# Patient Record
Sex: MALE | Employment: FULL TIME | URBAN - METROPOLITAN AREA
[De-identification: names, ages, dates, MRNs, and addresses within clinical notes are randomized per-mention and may not be internally consistent; named-entity substitution may affect disease eponyms.]

---

## 2017-01-10 RX ORDER — ZOLPIDEM TARTRATE 10 MG/1
TABLET ORAL
Qty: 30 TABLET | Refills: 2 | Status: SHIPPED | OUTPATIENT
Start: 2017-01-10 | End: 2017-05-30

## 2017-03-16 ENCOUNTER — TELEPHONE (OUTPATIENT)
Dept: FAMILY MEDICINE CLINIC | Facility: CLINIC | Age: 47
End: 2017-03-16

## 2017-03-16 NOTE — TELEPHONE ENCOUNTER
Wife is calling regarding pt having diarrhea. Pt is not vomiting as of yet.   Spouse would like to know what to do because she state that he has the same symptoms that she has

## 2017-03-16 NOTE — TELEPHONE ENCOUNTER
Actions Requested: spouse is being treated for gastroenteritis and now pt is having diarrhea, no vomiting. Asking if pt can have Zofran prescribed.   Situation/Background   Problem: diarrhea   Onset: today   Associated Symptoms: some nausea, no vomiting yet

## 2017-03-17 NOTE — TELEPHONE ENCOUNTER
Spoke with spouse and patient has thrown up once and now pretty quiet. No Zofran prescribed at this point.

## 2017-04-27 ENCOUNTER — OFFICE VISIT (OUTPATIENT)
Dept: FAMILY MEDICINE CLINIC | Facility: CLINIC | Age: 47
End: 2017-04-27

## 2017-04-27 VITALS
TEMPERATURE: 98 F | RESPIRATION RATE: 16 BRPM | WEIGHT: 204 LBS | HEIGHT: 65.5 IN | BODY MASS INDEX: 33.58 KG/M2 | HEART RATE: 60 BPM | SYSTOLIC BLOOD PRESSURE: 112 MMHG | DIASTOLIC BLOOD PRESSURE: 76 MMHG

## 2017-04-27 DIAGNOSIS — Z13.220 LIPID SCREENING: ICD-10-CM

## 2017-04-27 DIAGNOSIS — R53.83 FATIGUE, UNSPECIFIED TYPE: Primary | ICD-10-CM

## 2017-04-27 DIAGNOSIS — G47.26 SHIFT WORK SLEEP DISORDER: ICD-10-CM

## 2017-04-27 PROCEDURE — 99212 OFFICE O/P EST SF 10 MIN: CPT | Performed by: FAMILY MEDICINE

## 2017-04-27 PROCEDURE — 99214 OFFICE O/P EST MOD 30 MIN: CPT | Performed by: FAMILY MEDICINE

## 2017-04-27 NOTE — PATIENT INSTRUCTIONS
Continue with sleep medication. Consider sleep study. Chemistries ordered and done. Testosterone level drawn as well. Increase water intake and consider green smoothie daily.

## 2017-04-27 NOTE — PROGRESS NOTES
HPI:    Patient ID: Heath Dickens is a 48year old male. Malaise  This is a chronic problem. The current episode started more than 1 month ago. The problem occurs constantly. The problem has been gradually worsening.  Associated symptoms include fatigu present. Pulmonary/Chest: Effort normal and breath sounds normal. No respiratory distress. Abdominal: Soft. Bowel sounds are normal. He exhibits no distension. Neurological: He is alert and oriented to person, place, and time.  No cranial nerve deficit

## 2017-05-06 ENCOUNTER — TELEPHONE (OUTPATIENT)
Dept: FAMILY MEDICINE CLINIC | Facility: CLINIC | Age: 47
End: 2017-05-06

## 2017-05-06 RX ORDER — CHOLECALCIFEROL (VITAMIN D3) 1250 MCG
50000 CAPSULE ORAL
Qty: 3 CAPSULE | Refills: 2 | Status: SHIPPED | OUTPATIENT
Start: 2017-05-06 | End: 2018-04-14

## 2017-05-06 NOTE — TELEPHONE ENCOUNTER
----- Message from Cait Velasquez DO sent at 5/3/2017  7:05 AM CDT -----  All labs reviewed and are normal including testosterone. Notes Recorded by Ammy Shetty DO on 4/30/2017 at 10:11 PM  Vitamin D insufficient.  Prescription supplement s

## 2017-05-06 NOTE — TELEPHONE ENCOUNTER
Dr. Lyndsey Nicholas: please confirm dosage on Vitamin D. I didn't see RX in med list.    Then we will let patient know of results.

## 2017-05-30 RX ORDER — ZOLPIDEM TARTRATE 10 MG/1
TABLET ORAL
Qty: 30 TABLET | Refills: 2 | Status: SHIPPED | OUTPATIENT
Start: 2017-05-30 | End: 2017-08-28

## 2017-05-30 NOTE — TELEPHONE ENCOUNTER
Shruthi Barksdale needs a refill of:    •  Zolpidem Tartrate 10 MG Oral Tab, TAKE 1 TABLET BY MOUTH NIGHTLY AS NEEDED FOR SLEEP, Disp: 30 tablet, Rfl: 2

## 2017-05-31 RX ORDER — AZITHROMYCIN 250 MG/1
TABLET, FILM COATED ORAL
Qty: 6 TABLET | Refills: 0 | Status: SHIPPED | OUTPATIENT
Start: 2017-05-31 | End: 2017-07-10 | Stop reason: ALTCHOICE

## 2017-07-10 ENCOUNTER — OFFICE VISIT (OUTPATIENT)
Dept: GASTROENTEROLOGY | Facility: CLINIC | Age: 47
End: 2017-07-10

## 2017-07-10 VITALS
SYSTOLIC BLOOD PRESSURE: 106 MMHG | DIASTOLIC BLOOD PRESSURE: 71 MMHG | WEIGHT: 207.19 LBS | HEART RATE: 73 BPM | HEIGHT: 65 IN | BODY MASS INDEX: 34.52 KG/M2

## 2017-07-10 DIAGNOSIS — K62.5 RECTAL BLEEDING: ICD-10-CM

## 2017-07-10 DIAGNOSIS — Z12.11 SCREEN FOR COLON CANCER: Primary | ICD-10-CM

## 2017-07-10 PROCEDURE — 99213 OFFICE O/P EST LOW 20 MIN: CPT | Performed by: INTERNAL MEDICINE

## 2017-07-10 PROCEDURE — 99212 OFFICE O/P EST SF 10 MIN: CPT | Performed by: INTERNAL MEDICINE

## 2017-07-10 RX ORDER — CHOLECALCIFEROL (VITAMIN D3) 1250 MCG
CAPSULE ORAL
Refills: 2 | COMMUNITY
Start: 2017-06-27 | End: 2020-01-07 | Stop reason: ALTCHOICE

## 2017-07-10 RX ORDER — POLYETHYLENE GLYCOL 3350, SODIUM CHLORIDE, SODIUM BICARBONATE, POTASSIUM CHLORIDE 420; 11.2; 5.72; 1.48 G/4L; G/4L; G/4L; G/4L
POWDER, FOR SOLUTION ORAL
Qty: 1 BOTTLE | Refills: 0 | Status: SHIPPED | OUTPATIENT
Start: 2017-07-10 | End: 2018-07-21 | Stop reason: ALTCHOICE

## 2017-07-10 NOTE — PATIENT INSTRUCTIONS
1. Schedule colonoscopy with IV sedation    2.  bowel prep from pharmacy (split trilyte)    3. Continue all medications for procedure    4. Read all bowel prep instructions carefully    5.  AVOID seeds, nuts, popcorn, raw fruits and vegetables (anne

## 2017-07-10 NOTE — H&P
[address] [facility] [address] Gastroenterology                                                                                                  Clinic History and Physical     Pa by mouth  WHEN YOU ARRIVE AT THE OFFICE   for your procedure; you must have a . After the procedure, take 1 tablet every 4-6 hours for pain.  Disp: 20 tablet Rfl: 0       Allergies:  No Known Allergies    ROS:   CONSTITUTIONAL:  negative for fevers, proceed with screening colonoscopy. Patient is currently asymptomatic and denies diarrhea, hematochezia, thin-stools or weight loss.  We discussed risks/benefits/alternatives to procedure, including CT colonography and stool testing, they want to proceed wi

## 2017-07-27 ENCOUNTER — TELEPHONE (OUTPATIENT)
Dept: GASTROENTEROLOGY | Facility: CLINIC | Age: 47
End: 2017-07-27

## 2017-07-27 NOTE — TELEPHONE ENCOUNTER
Scheduled for:  Colonoscopy 13453  Provider Name: Salma De La Cruz  Date:  Wed 8/09/17  Location:  [facility]  Sedation:  IV  Time:  3:15 pm  Prep: split dose trilyte  Meds/Allergies Reconciled?:  NKDA  Diagnosis with codes:  Colon cancer screening  Z12.11, rectal bleeding

## 2017-08-09 ENCOUNTER — SURGERY (OUTPATIENT)
Age: 47
End: 2017-08-09

## 2017-08-09 ENCOUNTER — HOSPITAL ENCOUNTER (OUTPATIENT)
Facility: HOSPITAL | Age: 47
Setting detail: HOSPITAL OUTPATIENT SURGERY
Discharge: HOME | End: 2017-08-09
Attending: INTERNAL MEDICINE | Admitting: INTERNAL MEDICINE
Payer: COMMERCIAL

## 2017-08-09 VITALS
BODY MASS INDEX: 34.99 KG/M2 | RESPIRATION RATE: 14 BRPM | HEART RATE: 60 BPM | WEIGHT: 210 LBS | SYSTOLIC BLOOD PRESSURE: 111 MMHG | OXYGEN SATURATION: 97 % | HEIGHT: 65 IN | DIASTOLIC BLOOD PRESSURE: 73 MMHG

## 2017-08-09 DIAGNOSIS — K62.5 ANAL BLEEDING: ICD-10-CM

## 2017-08-09 DIAGNOSIS — Z12.11 COLON CANCER SCREENING: ICD-10-CM

## 2017-08-09 PROCEDURE — 45385 COLONOSCOPY W/LESION REMOVAL: CPT | Performed by: INTERNAL MEDICINE

## 2017-08-09 PROCEDURE — 0DBM8ZX EXCISION OF DESCENDING COLON, VIA NATURAL OR ARTIFICIAL OPENING ENDOSCOPIC, DIAGNOSTIC: ICD-10-PCS | Performed by: INTERNAL MEDICINE

## 2017-08-09 PROCEDURE — 99152 MOD SED SAME PHYS/QHP 5/>YRS: CPT | Performed by: INTERNAL MEDICINE

## 2017-08-09 PROCEDURE — 0DBH8ZX EXCISION OF CECUM, VIA NATURAL OR ARTIFICIAL OPENING ENDOSCOPIC, DIAGNOSTIC: ICD-10-PCS | Performed by: INTERNAL MEDICINE

## 2017-08-09 PROCEDURE — 0DBK8ZX EXCISION OF ASCENDING COLON, VIA NATURAL OR ARTIFICIAL OPENING ENDOSCOPIC, DIAGNOSTIC: ICD-10-PCS | Performed by: INTERNAL MEDICINE

## 2017-08-09 PROCEDURE — 99153 MOD SED SAME PHYS/QHP EA: CPT | Performed by: INTERNAL MEDICINE

## 2017-08-09 RX ORDER — SODIUM CHLORIDE 0.9 % (FLUSH) 0.9 %
10 SYRINGE (ML) INJECTION AS NEEDED
Status: DISCONTINUED | OUTPATIENT
Start: 2017-08-09 | End: 2017-08-09

## 2017-08-09 RX ORDER — MIDAZOLAM HYDROCHLORIDE 1 MG/ML
1 INJECTION INTRAMUSCULAR; INTRAVENOUS EVERY 5 MIN PRN
Status: DISCONTINUED | OUTPATIENT
Start: 2017-08-09 | End: 2017-08-09

## 2017-08-09 RX ORDER — SODIUM CHLORIDE, SODIUM LACTATE, POTASSIUM CHLORIDE, CALCIUM CHLORIDE 600; 310; 30; 20 MG/100ML; MG/100ML; MG/100ML; MG/100ML
INJECTION, SOLUTION INTRAVENOUS CONTINUOUS
Status: DISCONTINUED | OUTPATIENT
Start: 2017-08-09 | End: 2017-08-09

## 2017-08-09 RX ORDER — MIDAZOLAM HYDROCHLORIDE 1 MG/ML
INJECTION INTRAMUSCULAR; INTRAVENOUS
Status: DISCONTINUED | OUTPATIENT
Start: 2017-08-09 | End: 2017-08-09

## 2017-08-11 ENCOUNTER — TELEPHONE (OUTPATIENT)
Dept: GASTROENTEROLOGY | Facility: CLINIC | Age: 47
End: 2017-08-11

## 2017-08-11 NOTE — TELEPHONE ENCOUNTER
I mailed out CLN results letter to pt  Updated health maintenance  Entered into 5 yr CLN recall  Recall colon in 5 years per.  Colon done 8/09/17

## 2017-08-28 RX ORDER — ZOLPIDEM TARTRATE 10 MG/1
TABLET ORAL
Qty: 30 TABLET | Refills: 2 | Status: SHIPPED | OUTPATIENT
Start: 2017-08-28 | End: 2017-11-17

## 2017-09-18 ENCOUNTER — HOSPITAL ENCOUNTER (OUTPATIENT)
Dept: GENERAL RADIOLOGY | Age: 47
Discharge: HOME | End: 2017-09-18
Attending: FAMILY MEDICINE
Payer: COMMERCIAL

## 2017-09-18 ENCOUNTER — OFFICE VISIT (OUTPATIENT)
Dept: FAMILY MEDICINE CLINIC | Facility: CLINIC | Age: 47
End: 2017-09-18

## 2017-09-18 VITALS — HEIGHT: 65 IN

## 2017-09-18 DIAGNOSIS — R25.3 MUSCLE TWITCHING: ICD-10-CM

## 2017-09-18 DIAGNOSIS — M79.642 CHRONIC HAND PAIN, LEFT: Primary | ICD-10-CM

## 2017-09-18 DIAGNOSIS — M79.642 CHRONIC HAND PAIN, LEFT: ICD-10-CM

## 2017-09-18 DIAGNOSIS — G89.29 CHRONIC HAND PAIN, LEFT: ICD-10-CM

## 2017-09-18 DIAGNOSIS — G89.29 CHRONIC HAND PAIN, LEFT: Primary | ICD-10-CM

## 2017-09-18 PROCEDURE — 72050 X-RAY EXAM NECK SPINE 4/5VWS: CPT | Performed by: FAMILY MEDICINE

## 2017-09-18 PROCEDURE — 73130 X-RAY EXAM OF HAND: CPT | Performed by: FAMILY MEDICINE

## 2017-09-18 PROCEDURE — 99212 OFFICE O/P EST SF 10 MIN: CPT | Performed by: FAMILY MEDICINE

## 2017-09-18 PROCEDURE — 99214 OFFICE O/P EST MOD 30 MIN: CPT | Performed by: FAMILY MEDICINE

## 2017-09-18 NOTE — PATIENT INSTRUCTIONS
Pain management via prescription medications as needed. Await results of cervical and left hand xray. Consider medrol.   Consider ortho referral.

## 2017-09-18 NOTE — PROGRESS NOTES
HPI:    Patient ID: Elgin Barnes is a 50year old male. Hand Pain    The pain is present in the left hand and left fingers (Left middle finger noted at times to be twitching). This is a chronic problem.  The current episode started more than 1 month a No thyromegaly present. Cardiovascular: Normal rate and regular rhythm. Exam reveals no gallop. No murmur heard. Edema not present. Carotid bruit not present.   Pulmonary/Chest: Effort normal and breath sounds normal.   Musculoskeletal: Normal range

## 2017-09-19 ENCOUNTER — TELEPHONE (OUTPATIENT)
Dept: OTHER | Age: 47
End: 2017-09-19

## 2017-09-19 DIAGNOSIS — G89.29 CHRONIC HAND PAIN, LEFT: ICD-10-CM

## 2017-09-19 DIAGNOSIS — M79.642 CHRONIC HAND PAIN, LEFT: ICD-10-CM

## 2017-09-19 DIAGNOSIS — M54.12 CERVICAL RADICULAR PAIN: Primary | ICD-10-CM

## 2017-09-19 NOTE — TELEPHONE ENCOUNTER
----- Message from Dorene Stauffer RN sent at 9/18/2017  4:04 PM CDT -----      ----- Message -----  From: Giovanni Durán DO  Sent: 9/18/2017   1:20 PM  To:  Ines  [facility] Clinical Staff    As discussed in clinic there is narrowing of canals for the nerv

## 2017-09-19 NOTE — TELEPHONE ENCOUNTER
Verified pt name and . Reviewed Xray results and doctor's message with pt. Pt is asking doctor's recommendations for what he should do at this time. Please advise.

## 2017-09-28 ENCOUNTER — HOSPITAL ENCOUNTER (OUTPATIENT)
Dept: MRI IMAGING | Facility: HOSPITAL | Age: 47
Discharge: HOME | End: 2017-09-28
Attending: FAMILY MEDICINE
Payer: COMMERCIAL

## 2017-09-28 DIAGNOSIS — G89.29 CHRONIC HAND PAIN, LEFT: ICD-10-CM

## 2017-09-28 DIAGNOSIS — M54.12 CERVICAL RADICULAR PAIN: ICD-10-CM

## 2017-09-28 DIAGNOSIS — M79.642 CHRONIC HAND PAIN, LEFT: ICD-10-CM

## 2017-09-28 PROCEDURE — 72141 MRI NECK SPINE W/O DYE: CPT | Performed by: FAMILY MEDICINE

## 2017-11-20 RX ORDER — ZOLPIDEM TARTRATE 10 MG/1
TABLET ORAL
Qty: 30 TABLET | Refills: 0 | Status: SHIPPED | OUTPATIENT
Start: 2017-11-20 | End: 2017-12-19

## 2017-11-20 NOTE — TELEPHONE ENCOUNTER
Please advise regarding refill request.   Refill Protocol Appointment Criteria  · Appointment scheduled in the past 6 months or in the next 3 months  Recent Outpatient Visits            2 months ago Chronic hand pain, left    [facility], [facility], [address],

## 2017-12-18 NOTE — TELEPHONE ENCOUNTER
Toña Smith needs a refill of:    •  ZOLPIDEM TARTRATE 10 MG Oral Tab, TAKE 1 TABLET BY MOUTH NIGHTLY AS NEEDED FOR SLEEP, Disp: 30 tablet, Rfl: 0

## 2017-12-20 RX ORDER — ZOLPIDEM TARTRATE 10 MG/1
TABLET ORAL
Qty: 30 TABLET | Refills: 0 | OUTPATIENT
Start: 2017-12-20 | End: 2018-01-19

## 2017-12-21 RX ORDER — ZOLPIDEM TARTRATE 10 MG/1
TABLET ORAL
Qty: 30 TABLET | Refills: 0 | OUTPATIENT
Start: 2017-12-21

## 2018-01-19 RX ORDER — ZOLPIDEM TARTRATE 10 MG/1
TABLET ORAL
Qty: 30 TABLET | Refills: 0 | Status: SHIPPED | OUTPATIENT
Start: 2018-01-19 | End: 2018-02-17

## 2018-02-16 NOTE — TELEPHONE ENCOUNTER
Pt's spouse Janes Longoria , is requesting a refill for the pt of zolpidem tartrate 10mg , pt only have 1 left

## 2018-02-17 RX ORDER — ZOLPIDEM TARTRATE 10 MG/1
TABLET ORAL
Qty: 30 TABLET | Refills: 0 | OUTPATIENT
Start: 2018-02-17 | End: 2018-03-16

## 2018-02-17 NOTE — TELEPHONE ENCOUNTER
Please review and advise - pt down to 1 tab    LOV 9/18/17  Med last ordered 1/19/18    Routing to [facility]W for [employer]

## 2018-03-17 RX ORDER — ZOLPIDEM TARTRATE 10 MG/1
TABLET ORAL
Qty: 30 TABLET | Refills: 0 | OUTPATIENT
Start: 2018-03-17 | End: 2018-04-18

## 2018-03-17 NOTE — TELEPHONE ENCOUNTER
LOV: 9/18/17 Last Rx: 2/17/18    Please advise in regards to refill request. Thank You    Please respond to pool: MICKY TRUJILLO [facility] LPN/CMA

## 2018-03-17 NOTE — TELEPHONE ENCOUNTER
From: Ivanna Haddad  Sent: 3/16/2018 11:50 AM CDT  Subject: Medication Renewal Request    Ivanna Haddad would like a refill of the following medications:     Zolpidem Tartrate 10 MG Oral Tab [facility], DO]   Patient Comment: I have 1 pill left

## 2018-04-16 RX ORDER — CHOLECALCIFEROL (VITAMIN D3) 1250 MCG
CAPSULE ORAL
Qty: 3 CAPSULE | Refills: 0 | Status: SHIPPED | OUTPATIENT
Start: 2018-04-16 | End: 2020-01-14

## 2018-04-16 NOTE — TELEPHONE ENCOUNTER
LOV: 9/18/17 Last Rx: 6/27/17 Last Vitamin D: 4/27/17    No protocol     Please advise in regards to refill request. Thank You

## 2018-04-20 RX ORDER — ZOLPIDEM TARTRATE 10 MG/1
TABLET ORAL
Qty: 30 TABLET | Refills: 0 | Status: SHIPPED | OUTPATIENT
Start: 2018-04-20 | End: 2018-05-15

## 2018-05-15 ENCOUNTER — TELEPHONE (OUTPATIENT)
Dept: FAMILY MEDICINE CLINIC | Facility: CLINIC | Age: 48
End: 2018-05-15

## 2018-05-15 DIAGNOSIS — G47.00 INSOMNIA, UNSPECIFIED TYPE: Primary | ICD-10-CM

## 2018-05-15 RX ORDER — ZOLPIDEM TARTRATE 10 MG/1
TABLET ORAL
Qty: 90 TABLET | Refills: 1 | Status: SHIPPED | OUTPATIENT
Start: 2018-05-15 | End: 2018-08-10

## 2018-07-21 ENCOUNTER — APPOINTMENT (OUTPATIENT)
Dept: LAB | Age: 48
End: 2018-07-21
Attending: FAMILY MEDICINE
Payer: COMMERCIAL

## 2018-07-21 ENCOUNTER — OFFICE VISIT (OUTPATIENT)
Dept: FAMILY MEDICINE CLINIC | Facility: CLINIC | Age: 48
End: 2018-07-21
Payer: COMMERCIAL

## 2018-07-21 VITALS
TEMPERATURE: 98 F | SYSTOLIC BLOOD PRESSURE: 120 MMHG | HEIGHT: 65 IN | RESPIRATION RATE: 17 BRPM | WEIGHT: 207 LBS | DIASTOLIC BLOOD PRESSURE: 81 MMHG | HEART RATE: 73 BPM | BODY MASS INDEX: 34.49 KG/M2

## 2018-07-21 DIAGNOSIS — R39.9 URINARY SYMPTOM OR SIGN: ICD-10-CM

## 2018-07-21 DIAGNOSIS — Z13.29 THYROID DISORDER SCREEN: ICD-10-CM

## 2018-07-21 DIAGNOSIS — Z00.00 ROUTINE PHYSICAL EXAMINATION: Primary | ICD-10-CM

## 2018-07-21 DIAGNOSIS — Z12.5 PROSTATE CANCER SCREENING: ICD-10-CM

## 2018-07-21 DIAGNOSIS — M25.572 ACUTE LEFT ANKLE PAIN: ICD-10-CM

## 2018-07-21 DIAGNOSIS — Z13.220 LIPID SCREENING: ICD-10-CM

## 2018-07-21 LAB
ALBUMIN SERPL BCP-MCNC: 3.7 G/DL (ref 3.5–4.8)
ALBUMIN/GLOB SERPL: 1.4 {RATIO} (ref 1–2)
ALP SERPL-CCNC: 52 U/L (ref 32–100)
ALT SERPL-CCNC: 11 U/L (ref 17–63)
ANION GAP SERPL CALC-SCNC: 6 MMOL/L (ref 0–18)
AST SERPL-CCNC: 15 U/L (ref 15–41)
BILIRUB SERPL-MCNC: 0.8 MG/DL (ref 0.3–1.2)
BILIRUB UR QL: NEGATIVE
BUN SERPL-MCNC: 7 MG/DL (ref 8–20)
BUN/CREAT SERPL: 5.9 (ref 10–20)
CALCIUM SERPL-MCNC: 9.3 MG/DL (ref 8.5–10.5)
CHLORIDE SERPL-SCNC: 108 MMOL/L (ref 95–110)
CHOLEST SERPL-MCNC: 181 MG/DL (ref 110–200)
CLARITY UR: CLEAR
CO2 SERPL-SCNC: 25 MMOL/L (ref 22–32)
COLOR UR: YELLOW
CREAT SERPL-MCNC: 1.18 MG/DL (ref 0.5–1.5)
GLOBULIN PLAS-MCNC: 2.7 G/DL (ref 2.5–3.7)
GLUCOSE SERPL-MCNC: 99 MG/DL (ref 70–99)
GLUCOSE UR-MCNC: NEGATIVE MG/DL
HDLC SERPL-MCNC: 43 MG/DL
HGB UR QL STRIP.AUTO: NEGATIVE
KETONES UR-MCNC: NEGATIVE MG/DL
LDLC SERPL CALC-MCNC: 124 MG/DL (ref 0–99)
LEUKOCYTE ESTERASE UR QL STRIP.AUTO: NEGATIVE
NITRITE UR QL STRIP.AUTO: NEGATIVE
NONHDLC SERPL-MCNC: 138 MG/DL
OSMOLALITY UR CALC.SUM OF ELEC: 286 MOSM/KG (ref 275–295)
PATIENT FASTING: YES
PH UR: 6 [PH] (ref 5–8)
POTASSIUM SERPL-SCNC: 3.5 MMOL/L (ref 3.3–5.1)
PROT SERPL-MCNC: 6.4 G/DL (ref 5.9–8.4)
PROT UR-MCNC: NEGATIVE MG/DL
PSA SERPL-MCNC: 0.4 NG/ML (ref 0–4)
SODIUM SERPL-SCNC: 139 MMOL/L (ref 136–144)
SP GR UR STRIP: 1.01 (ref 1–1.03)
TRIGL SERPL-MCNC: 70 MG/DL (ref 1–149)
TSH SERPL-ACNC: 0.78 UIU/ML (ref 0.45–5.33)
UROBILINOGEN UR STRIP-ACNC: <2
VIT C UR-MCNC: NEGATIVE MG/DL

## 2018-07-21 PROCEDURE — 85027 COMPLETE CBC AUTOMATED: CPT | Performed by: FAMILY MEDICINE

## 2018-07-21 PROCEDURE — 93000 ELECTROCARDIOGRAM COMPLETE: CPT | Performed by: FAMILY MEDICINE

## 2018-07-21 PROCEDURE — 80053 COMPREHEN METABOLIC PANEL: CPT | Performed by: FAMILY MEDICINE

## 2018-07-21 PROCEDURE — 99396 PREV VISIT EST AGE 40-64: CPT | Performed by: FAMILY MEDICINE

## 2018-07-21 PROCEDURE — 99213 OFFICE O/P EST LOW 20 MIN: CPT | Performed by: FAMILY MEDICINE

## 2018-07-21 PROCEDURE — 81003 URINALYSIS AUTO W/O SCOPE: CPT | Performed by: FAMILY MEDICINE

## 2018-07-21 PROCEDURE — 85007 BL SMEAR W/DIFF WBC COUNT: CPT | Performed by: FAMILY MEDICINE

## 2018-07-21 PROCEDURE — 36415 COLL VENOUS BLD VENIPUNCTURE: CPT | Performed by: FAMILY MEDICINE

## 2018-07-21 PROCEDURE — 80061 LIPID PANEL: CPT | Performed by: FAMILY MEDICINE

## 2018-07-21 PROCEDURE — 85025 COMPLETE CBC W/AUTO DIFF WBC: CPT | Performed by: FAMILY MEDICINE

## 2018-07-21 PROCEDURE — 84443 ASSAY THYROID STIM HORMONE: CPT | Performed by: FAMILY MEDICINE

## 2018-07-21 PROCEDURE — 85060 BLOOD SMEAR INTERPRETATION: CPT | Performed by: FAMILY MEDICINE

## 2018-07-21 NOTE — PATIENT INSTRUCTIONS
All adult screening ordered and done appropriate for patient's age and gender and risk factors and complaints. Comply with medications. Medication reviewed and renewed where needed and appropriate. Monitor blood pressures and record at home.  Limit salt

## 2018-07-23 LAB
BASOPHILS # BLD: 0.2 K/UL (ref 0–0.2)
BASOPHILS NFR BLD: 3 %
EOSINOPHIL # BLD: 0.1 K/UL (ref 0–0.7)
EOSINOPHIL NFR BLD: 1 %
ERYTHROCYTE [DISTWIDTH] IN BLOOD BY AUTOMATED COUNT: 14 % (ref 11–15)
HCT VFR BLD AUTO: 46.5 % (ref 41–52)
HGB BLD-MCNC: 15.5 G/DL (ref 13.5–17.5)
LYMPHOCYTES # BLD: 3 K/UL (ref 1–4)
LYMPHOCYTES NFR BLD: 43 %
MCH RBC QN AUTO: 31.1 PG (ref 27–32)
MCHC RBC AUTO-ENTMCNC: 33.3 G/DL (ref 32–37)
MCV RBC AUTO: 93.4 FL (ref 80–100)
MONOCYTES # BLD: 0.4 K/UL (ref 0–1)
MONOCYTES NFR BLD: 7 %
NEUTROPHILS # BLD AUTO: 1.9 K/UL (ref 1.8–7.7)
NEUTROPHILS NFR BLD: 35 %
PLATELET # BLD AUTO: 185 K/UL (ref 140–400)
PMV BLD AUTO: 8.8 FL (ref 7.4–10.3)
RBC # BLD AUTO: 4.98 M/UL (ref 4.5–5.9)
VARIANT LYMPHS NFR BLD MANUAL: 11 %
WBC # BLD AUTO: 5.5 K/UL (ref 4–11)

## 2018-08-01 NOTE — PROGRESS NOTES
HPI:    Patient ID: Ricky Rankin is a 46year old male. 46year old AA mal here for preventive care and screening. Has a continue challenge with sleep secondary to shift (work) effect. Has a left ankle pain without history of trauma.        Ankle Pain Cardiovascular: Normal rate, regular rhythm and normal heart sounds. Exam reveals no gallop. Edema not present. Carotid bruit not present. Pulmonary/Chest: Effort normal and breath sounds normal. No respiratory distress. Abdominal: Soft.  Bowel malik 8/11/2018), or if symptoms worsen or fail to improve.          OR#[id number]

## 2018-08-09 DIAGNOSIS — G47.00 INSOMNIA, UNSPECIFIED TYPE: ICD-10-CM

## 2018-08-09 RX ORDER — ZOLPIDEM TARTRATE 10 MG/1
TABLET ORAL
Qty: 90 TABLET | Refills: 1 | Status: CANCELLED
Start: 2018-08-09

## 2018-08-10 NOTE — TELEPHONE ENCOUNTER
Medication Detail      Disp Refills Start End    Zolpidem Tartrate 10 MG Oral Tab 90 tablet 1 5/15/2018     Sig: TAKE 1 TABLET BY MOUTH NIGHTLY AS NEEDED FOR SLEEP      Email sent to patient.

## 2018-08-10 NOTE — TELEPHONE ENCOUNTER
From: Brady Kiran  Sent: 8/9/2018 7:01 PM CDT  Subject: Medication Renewal Request    Brady Kiran would like a refill of the following medications:     Zolpidem Tartrate 10 MG Oral Tab RIGOBERTO Reardon   Patient Comment: I’m completely ou

## 2019-01-03 RX ORDER — AZITHROMYCIN 250 MG/1
TABLET, FILM COATED ORAL
Qty: 6 TABLET | Refills: 0 | Status: SHIPPED | OUTPATIENT
Start: 2019-01-03 | End: 2020-01-07 | Stop reason: ALTCHOICE

## 2019-01-26 DIAGNOSIS — G47.00 INSOMNIA, UNSPECIFIED TYPE: ICD-10-CM

## 2019-01-26 NOTE — TELEPHONE ENCOUNTER
Review pended refill request as it does not fall under a protocol.   Please respond to pool: MICKY TRUJILLO OPO LPN/CMA    Last Rx: 4/56/42    Refill Protocol Appointment Criteria  · Appointment scheduled in the past 6 months or in the next 3 months  Recent Outpatie

## 2019-01-27 RX ORDER — ZOLPIDEM TARTRATE 10 MG/1
TABLET ORAL
Qty: 90 TABLET | Refills: 0 | OUTPATIENT
Start: 2019-01-27 | End: 2019-04-23

## 2019-01-29 ENCOUNTER — TELEPHONE (OUTPATIENT)
Dept: FAMILY MEDICINE CLINIC | Facility: CLINIC | Age: 49
End: 2019-01-29

## 2019-01-29 NOTE — TELEPHONE ENCOUNTER
Per pharmacy pt insurance requires a PA for Zolpidem 10mg Tablets Qty: 90. Please call pt plan at [phone] to initiate PA. Patient ID# [id number].

## 2019-01-29 NOTE — TELEPHONE ENCOUNTER
Called the pharmacy, and pharmacist stts they received the Rx, but it's not covered by pt's new insurance.  Pharmacy will dispense 15 tabs which plan allows for 25 days, and fax PA request.

## 2019-01-30 NOTE — TELEPHONE ENCOUNTER
PA for Zolpidem Tartrate 10 mg tab completed with [employer] via CMM response time 24-72 hours KEY [id number].

## 2019-02-01 NOTE — TELEPHONE ENCOUNTER
PA approved effective 01/29/2019-01/29/2022. Pharmacy notified and pt via [facility] [address].

## 2019-04-23 DIAGNOSIS — G47.00 INSOMNIA, UNSPECIFIED TYPE: ICD-10-CM

## 2019-04-24 RX ORDER — ZOLPIDEM TARTRATE 10 MG/1
TABLET ORAL
Qty: 90 TABLET | Refills: 1 | OUTPATIENT
Start: 2019-04-24 | End: 2019-10-05

## 2019-04-24 NOTE — TELEPHONE ENCOUNTER
Controlled medications pending for review. If approved needs to be called in or faxed by on site staff.     Last Rx: 1-27-19 # 90  LOV: 7-21-18    Requested Prescriptions     Pending Prescriptions Disp Refills   • Zolpidem Tartrate 10 MG Oral Tab 90 tablet

## 2019-10-05 DIAGNOSIS — G47.00 INSOMNIA, UNSPECIFIED TYPE: ICD-10-CM

## 2019-10-05 NOTE — TELEPHONE ENCOUNTER
To reception staff, pls call pt for appt for physical.   mychart message sent to pt. Controlled medication pending for review. Please change to phone in, fax, or print script if not being sent electronically.     Last Rx: 4-4-19 # 90 + 1  LOV:  7-21-1

## 2019-10-06 RX ORDER — ZOLPIDEM TARTRATE 10 MG/1
TABLET ORAL
Qty: 90 TABLET | Refills: 0 | Status: SHIPPED | OUTPATIENT
Start: 2019-10-06 | End: 2019-12-28

## 2019-12-27 DIAGNOSIS — G47.00 INSOMNIA, UNSPECIFIED TYPE: ICD-10-CM

## 2019-12-28 NOTE — TELEPHONE ENCOUNTER
To reception staff, pls call pt for appt. Also [employer] message sent to pt. Controlled medication pending for review. Please change to phone in, fax, or print script if not being sent electronically.     Last Rx: 10-6-19 # 90  LOV: 7-21-18  No future

## 2019-12-29 RX ORDER — ZOLPIDEM TARTRATE 10 MG/1
TABLET ORAL
Qty: 30 TABLET | Refills: 0 | Status: SHIPPED | OUTPATIENT
Start: 2019-12-29 | End: 2020-01-07

## 2020-01-07 ENCOUNTER — OFFICE VISIT (OUTPATIENT)
Dept: FAMILY MEDICINE CLINIC | Facility: CLINIC | Age: 50
End: 2020-01-07
Payer: COMMERCIAL

## 2020-01-07 ENCOUNTER — APPOINTMENT (OUTPATIENT)
Dept: LAB | Age: 50
End: 2020-01-07
Attending: FAMILY MEDICINE
Payer: COMMERCIAL

## 2020-01-07 VITALS
SYSTOLIC BLOOD PRESSURE: 119 MMHG | RESPIRATION RATE: 17 BRPM | BODY MASS INDEX: 34 KG/M2 | DIASTOLIC BLOOD PRESSURE: 82 MMHG | HEART RATE: 69 BPM | HEIGHT: 65 IN

## 2020-01-07 DIAGNOSIS — Z12.5 PROSTATE CANCER SCREENING: ICD-10-CM

## 2020-01-07 DIAGNOSIS — G47.00 INSOMNIA, UNSPECIFIED TYPE: ICD-10-CM

## 2020-01-07 DIAGNOSIS — Z13.29 THYROID DISORDER SCREEN: ICD-10-CM

## 2020-01-07 DIAGNOSIS — Z00.00 ROUTINE PHYSICAL EXAMINATION: Primary | ICD-10-CM

## 2020-01-07 DIAGNOSIS — Z13.220 LIPID SCREENING: ICD-10-CM

## 2020-01-07 PROCEDURE — 93000 ELECTROCARDIOGRAM COMPLETE: CPT | Performed by: FAMILY MEDICINE

## 2020-01-07 PROCEDURE — 99396 PREV VISIT EST AGE 40-64: CPT | Performed by: FAMILY MEDICINE

## 2020-01-07 RX ORDER — ZOLPIDEM TARTRATE 10 MG/1
10 TABLET ORAL NIGHTLY
Qty: 30 TABLET | Refills: 2 | Status: SHIPPED | OUTPATIENT
Start: 2020-01-07 | End: 2020-04-16

## 2020-01-07 NOTE — PATIENT INSTRUCTIONS
All adult screening ordered and done appropriate for patient's age and gender and risk factors and complaints. Comply with medications. Medication reviewed and renewed where needed and appropriate.   Encouraged physical fitness and daily physical activity

## 2020-01-07 NOTE — PROGRESS NOTES
HPI:    Patient ID: Blaire Swanson is a 44year old male.     44 year male here for complete preventive care physical and for status update on any confirmed chronic medical illnesses and follow up on any previous labs or procedures that were suggestive or FREE    3. Thyroid disorder screen  Screened  - TSH W REFLEX TO FREE T4    4.  Lipid screening  Screened  - LIPID PANEL    Orders Placed This Encounter      CBC W Differential W Platelet [E]      Comp Metabolic Panel (14) [E]      Lipid Panel [E]      TSH W

## 2020-01-08 LAB
ABSOLUTE BASOPHILS: 73 CELLS/UL (ref 0–200)
ABSOLUTE EOSINOPHILS: 92 CELLS/UL (ref 15–500)
ABSOLUTE LYMPHOCYTES: 3404 CELLS/UL (ref 850–3900)
ABSOLUTE MONOCYTES: 512 CELLS/UL (ref 200–950)
ABSOLUTE NEUTROPHILS: 2019 CELLS/UL (ref 1500–7800)
ALBUMIN/GLOBULIN RATIO: 1.4 (CALC) (ref 1–2.5)
ALBUMIN: 4 G/DL (ref 3.6–5.1)
ALKALINE PHOSPHATASE: 69 U/L (ref 40–115)
ALT: 12 U/L (ref 9–46)
APPEARANCE: CLEAR
AST: 14 U/L (ref 10–35)
BASOPHILS: 1.2 %
BILIRUBIN, TOTAL: 0.8 MG/DL (ref 0.2–1.2)
BILIRUBIN: NEGATIVE
BUN: 12 MG/DL (ref 7–25)
CALCIUM: 9 MG/DL (ref 8.6–10.3)
CARBON DIOXIDE: 26 MMOL/L (ref 20–32)
CHLORIDE: 104 MMOL/L (ref 98–110)
CHOL/HDLC RATIO: 5 (CALC)
CHOLESTEROL, TOTAL: 214 MG/DL
COLOR: YELLOW
CREATININE: 1.18 MG/DL (ref 0.7–1.33)
EGFR IF AFRICN AM: 82 ML/MIN/1.73M2
EGFR IF NONAFRICN AM: 71 ML/MIN/1.73M2
EOSINOPHILS: 1.5 %
GLOBULIN: 2.8 G/DL (CALC) (ref 1.9–3.7)
GLUCOSE: 91 MG/DL (ref 65–99)
GLUCOSE: NEGATIVE
HDL CHOLESTEROL: 43 MG/DL
HEMATOCRIT: 48.2 % (ref 38.5–50)
HEMOGLOBIN: 16.6 G/DL (ref 13.2–17.1)
KETONES: NEGATIVE
LDL-CHOLESTEROL: 147 MG/DL (CALC)
LEUKOCYTE ESTERASE: NEGATIVE
LYMPHOCYTES: 55.8 %
MCH: 31.6 PG (ref 27–33)
MCHC: 34.4 G/DL (ref 32–36)
MCV: 91.6 FL (ref 80–100)
MONOCYTES: 8.4 %
MPV: 10.4 FL (ref 7.5–12.5)
NEUTROPHILS: 33.1 %
NITRITE: NEGATIVE
NON-HDL CHOLESTEROL: 171 MG/DL (CALC)
OCCULT BLOOD: NEGATIVE
PH: 7 (ref 5–8)
PLATELET COUNT: 224 THOUSAND/UL (ref 140–400)
POTASSIUM: 4 MMOL/L (ref 3.5–5.3)
PROTEIN, TOTAL: 6.8 G/DL (ref 6.1–8.1)
PROTEIN: NEGATIVE
RDW: 13.4 % (ref 11–15)
RED BLOOD CELL COUNT: 5.26 MILLION/UL (ref 4.2–5.8)
SODIUM: 139 MMOL/L (ref 135–146)
SPECIFIC GRAVITY: 1.02 (ref 1–1.03)
TOTAL PSA: 0.5 NG/ML
TRIGLYCERIDES: 118 MG/DL
TSH W/REFLEX TO FT4: 1.65 MIU/L (ref 0.4–4.5)
WHITE BLOOD CELL COUNT: 6.1 THOUSAND/UL (ref 3.8–10.8)

## 2020-01-14 RX ORDER — CHOLECALCIFEROL (VITAMIN D3) 1250 MCG
CAPSULE ORAL
Qty: 3 CAPSULE | Refills: 0 | Status: SHIPPED | OUTPATIENT
Start: 2020-01-14 | End: 2020-07-09

## 2020-01-28 DIAGNOSIS — G47.00 INSOMNIA, UNSPECIFIED TYPE: ICD-10-CM

## 2020-01-30 RX ORDER — ZOLPIDEM TARTRATE 10 MG/1
TABLET ORAL
Qty: 30 TABLET | Refills: 0 | OUTPATIENT
Start: 2020-01-30

## 2020-03-04 ENCOUNTER — TELEPHONE (OUTPATIENT)
Dept: FAMILY MEDICINE CLINIC | Facility: CLINIC | Age: 50
End: 2020-03-04

## 2020-03-04 RX ORDER — AZITHROMYCIN 250 MG/1
TABLET, FILM COATED ORAL
Qty: 6 TABLET | Refills: 0 | Status: SHIPPED | OUTPATIENT
Start: 2020-03-04 | End: 2021-09-13 | Stop reason: ALTCHOICE

## 2020-03-04 NOTE — TELEPHONE ENCOUNTER
Pt called in states he is having viral symptoms. Having body aches, fatigue, sneezing. Wife was in urgent care for cough and fever. Negative for flu. Is asking for another Pallavi Erb?

## 2020-04-16 DIAGNOSIS — G47.00 INSOMNIA, UNSPECIFIED TYPE: ICD-10-CM

## 2020-04-16 RX ORDER — ZOLPIDEM TARTRATE 10 MG/1
TABLET ORAL
Qty: 30 TABLET | Refills: 0 | Status: SHIPPED | OUTPATIENT
Start: 2020-04-16 | End: 2020-05-16

## 2020-05-15 DIAGNOSIS — G47.00 INSOMNIA, UNSPECIFIED TYPE: ICD-10-CM

## 2020-05-16 RX ORDER — ZOLPIDEM TARTRATE 10 MG/1
TABLET ORAL
Qty: 30 TABLET | Refills: 0 | Status: SHIPPED | OUTPATIENT
Start: 2020-05-16 | End: 2020-06-11

## 2020-06-11 DIAGNOSIS — G47.00 INSOMNIA, UNSPECIFIED TYPE: ICD-10-CM

## 2020-06-11 RX ORDER — ZOLPIDEM TARTRATE 10 MG/1
TABLET ORAL
Qty: 30 TABLET | Refills: 0 | Status: SHIPPED | OUTPATIENT
Start: 2020-06-11 | End: 2020-07-06

## 2020-07-04 DIAGNOSIS — G47.00 INSOMNIA, UNSPECIFIED TYPE: ICD-10-CM

## 2020-07-06 RX ORDER — ZOLPIDEM TARTRATE 10 MG/1
TABLET ORAL
Qty: 30 TABLET | Refills: 0 | Status: SHIPPED | OUTPATIENT
Start: 2020-07-06 | End: 2020-08-06

## 2020-07-09 RX ORDER — CHOLECALCIFEROL (VITAMIN D3) 1250 MCG
CAPSULE ORAL
Qty: 3 CAPSULE | Refills: 0 | Status: SHIPPED | OUTPATIENT
Start: 2020-07-09 | End: 2021-04-23

## 2020-08-06 DIAGNOSIS — G47.00 INSOMNIA, UNSPECIFIED TYPE: ICD-10-CM

## 2020-08-06 RX ORDER — ZOLPIDEM TARTRATE 10 MG/1
10 TABLET ORAL NIGHTLY
Qty: 5 TABLET | Refills: 0 | Status: SHIPPED | OUTPATIENT
Start: 2020-08-06 | End: 2020-08-12

## 2020-08-06 RX ORDER — ZOLPIDEM TARTRATE 10 MG/1
TABLET ORAL
Qty: 5 TABLET | Refills: 0 | Status: SHIPPED | OUTPATIENT
Start: 2020-08-06 | End: 2020-08-06

## 2020-08-12 DIAGNOSIS — G47.00 INSOMNIA, UNSPECIFIED TYPE: ICD-10-CM

## 2020-08-12 RX ORDER — ZOLPIDEM TARTRATE 10 MG/1
TABLET ORAL
Qty: 30 TABLET | Refills: 0 | Status: SHIPPED | OUTPATIENT
Start: 2020-08-12 | End: 2020-09-07

## 2020-09-05 DIAGNOSIS — G47.00 INSOMNIA, UNSPECIFIED TYPE: ICD-10-CM

## 2020-09-07 RX ORDER — ZOLPIDEM TARTRATE 10 MG/1
TABLET ORAL
Qty: 30 TABLET | Refills: 0 | Status: SHIPPED | OUTPATIENT
Start: 2020-09-07 | End: 2020-10-06

## 2020-09-09 ENCOUNTER — TELEPHONE (OUTPATIENT)
Dept: FAMILY MEDICINE CLINIC | Facility: CLINIC | Age: 50
End: 2020-09-09

## 2020-09-09 NOTE — TELEPHONE ENCOUNTER
Spoke with spouse Jayden Amanda (TAMMY verified)--asking to transfer Zolpidem from [facility] in [facility] to [facility] in [facility]. Pharmacy unable to transfer Rx because it is a controlled substance.     Spoke with Funmilayo Shah at [facility] in [facility] and cancelled  Z

## 2020-10-06 DIAGNOSIS — G47.00 INSOMNIA, UNSPECIFIED TYPE: ICD-10-CM

## 2020-10-06 RX ORDER — ZOLPIDEM TARTRATE 10 MG/1
TABLET ORAL
Qty: 30 TABLET | Refills: 0 | Status: SHIPPED | OUTPATIENT
Start: 2020-10-06 | End: 2020-11-02

## 2020-11-01 DIAGNOSIS — G47.00 INSOMNIA, UNSPECIFIED TYPE: ICD-10-CM

## 2020-11-02 RX ORDER — ZOLPIDEM TARTRATE 10 MG/1
TABLET ORAL
Qty: 30 TABLET | Refills: 0 | Status: SHIPPED | OUTPATIENT
Start: 2020-11-02 | End: 2020-12-01

## 2020-12-01 DIAGNOSIS — G47.00 INSOMNIA, UNSPECIFIED TYPE: ICD-10-CM

## 2020-12-01 RX ORDER — ZOLPIDEM TARTRATE 10 MG/1
TABLET ORAL
Qty: 30 TABLET | Refills: 0 | Status: SHIPPED | OUTPATIENT
Start: 2020-12-01 | End: 2020-12-29

## 2020-12-18 ENCOUNTER — TELEMEDICINE (OUTPATIENT)
Dept: FAMILY MEDICINE CLINIC | Facility: CLINIC | Age: 50
End: 2020-12-18
Payer: COMMERCIAL

## 2020-12-18 ENCOUNTER — TELEPHONE (OUTPATIENT)
Dept: FAMILY MEDICINE CLINIC | Facility: CLINIC | Age: 50
End: 2020-12-18

## 2020-12-18 DIAGNOSIS — R68.89 FLU-LIKE SYMPTOMS: Primary | ICD-10-CM

## 2020-12-18 PROCEDURE — 99213 OFFICE O/P EST LOW 20 MIN: CPT | Performed by: FAMILY MEDICINE

## 2020-12-18 NOTE — PATIENT INSTRUCTIONS
Clear fluid hydration. Rest. Take all medications as prescribed. If symptoms persist or worsen please call or return to clinic ASAP. Patient has been advised to increase water intake and drink water frequently throughout the day.   Additionally the patient

## 2020-12-18 NOTE — PROGRESS NOTES
HPI:    Patient ID: Nirmala Singh is a 44year old male. On Sunday, December 13, 210 this 49-year-old -American gentleman went to have a COVID-19 test secondary to \"not feeling well\".   He has headaches, body aches, fever, with normal stools the defined types were placed in this encounter. Meds This Visit:  Requested Prescriptions      No prescriptions requested or ordered in this encounter       Imaging & Referrals:  None  Patient Instructions   Clear fluid hydration.  Rest. Take all medi

## 2020-12-18 NOTE — TELEPHONE ENCOUNTER
Patient's wife called stating the patient  is having technical issues with doximity visit for today.

## 2020-12-28 DIAGNOSIS — G47.00 INSOMNIA, UNSPECIFIED TYPE: ICD-10-CM

## 2020-12-28 RX ORDER — ZOLPIDEM TARTRATE 10 MG/1
TABLET ORAL
Qty: 30 TABLET | Refills: 0 | OUTPATIENT
Start: 2020-12-28

## 2020-12-29 DIAGNOSIS — G47.00 INSOMNIA, UNSPECIFIED TYPE: ICD-10-CM

## 2020-12-29 RX ORDER — ZOLPIDEM TARTRATE 10 MG/1
TABLET ORAL
Qty: 30 TABLET | Refills: 0 | Status: SHIPPED | OUTPATIENT
Start: 2020-12-29 | End: 2021-01-29

## 2020-12-29 RX ORDER — ZOLPIDEM TARTRATE 10 MG/1
TABLET ORAL
Qty: 30 TABLET | Refills: 0 | Status: SHIPPED | OUTPATIENT
Start: 2020-12-29 | End: 2021-04-19

## 2021-01-28 DIAGNOSIS — G47.00 INSOMNIA, UNSPECIFIED TYPE: ICD-10-CM

## 2021-01-29 RX ORDER — ZOLPIDEM TARTRATE 10 MG/1
TABLET ORAL
Qty: 30 TABLET | Refills: 0 | Status: SHIPPED | OUTPATIENT
Start: 2021-01-29 | End: 2021-02-21

## 2021-01-30 ENCOUNTER — TELEPHONE (OUTPATIENT)
Dept: FAMILY MEDICINE CLINIC | Facility: CLINIC | Age: 51
End: 2021-01-30

## 2021-01-30 NOTE — TELEPHONE ENCOUNTER
Pharmacy is requesting a PA for ZOLPIDEM 10MG TABLETS      Plan only covers 15 tabs every 25 days     pls call     Pt id [id number]

## 2021-02-01 NOTE — TELEPHONE ENCOUNTER
Prior authorization for Zolpidem Tartrate completed w/ [employer] on cover my meds Key: [id number], turn around time 1-5 days.

## 2021-02-01 NOTE — TELEPHONE ENCOUNTER
Patient paid out of pocket for this medication last time he picked it up.  The pharmacist told this patient he will need a prior auth form sent to [employer] in order to get the full supply of this medication approved through insurance Can we please fill one out

## 2021-02-05 NOTE — TELEPHONE ENCOUNTER
[employer], spoke to Marshall Shepherd who states patient's pharmacy benefits are through RX benefits. Prior authorization for Zolpidem completed w/ Rxbenefits on cover my meds Key: [id number], turn around time 1-3 days.

## 2021-02-17 NOTE — TELEPHONE ENCOUNTER
Spoke to rx benefit and there has been no decision made. They have sent this to their review department to expedite the request and it will take 1-3 business days.     Spoke to patient and made him aware that the prior authorization is still in process and

## 2021-02-17 NOTE — TELEPHONE ENCOUNTER
Prior authorization for zolpidem has been approved from 2/17/21 through 2/16/22 pharmacy has been notified.    Spoke to patient to notify him that its been authorized

## 2021-02-20 DIAGNOSIS — G47.00 INSOMNIA, UNSPECIFIED TYPE: ICD-10-CM

## 2021-02-21 RX ORDER — ZOLPIDEM TARTRATE 10 MG/1
TABLET ORAL
Qty: 30 TABLET | Refills: 0 | Status: SHIPPED | OUTPATIENT
Start: 2021-02-21 | End: 2021-03-21

## 2021-03-18 ENCOUNTER — PATIENT MESSAGE (OUTPATIENT)
Dept: FAMILY MEDICINE CLINIC | Facility: CLINIC | Age: 51
End: 2021-03-18

## 2021-03-18 RX ORDER — VALACYCLOVIR HYDROCHLORIDE 1 G/1
1 TABLET, FILM COATED ORAL DAILY
Qty: 21 TABLET | Refills: 1 | Status: SHIPPED | OUTPATIENT
Start: 2021-03-18 | End: 2021-04-23

## 2021-03-18 NOTE — TELEPHONE ENCOUNTER
From: Jesús Ramon  To: Linda Tang DO  Sent: 3/18/2021 12:04 PM CDT  Subject: Non-Urgent Medical Question    Hello,    Can you prescribe Valtrex for the cold sore on my lip, I have tried OTC abreva but it doesn't seem to be helping.  I have bee

## 2021-03-20 DIAGNOSIS — G47.00 INSOMNIA, UNSPECIFIED TYPE: ICD-10-CM

## 2021-03-21 RX ORDER — ZOLPIDEM TARTRATE 10 MG/1
TABLET ORAL
Qty: 30 TABLET | Refills: 0 | Status: SHIPPED | OUTPATIENT
Start: 2021-03-21 | End: 2021-04-11

## 2021-04-10 DIAGNOSIS — G47.00 INSOMNIA, UNSPECIFIED TYPE: ICD-10-CM

## 2021-04-11 RX ORDER — ZOLPIDEM TARTRATE 10 MG/1
TABLET ORAL
Qty: 30 TABLET | Refills: 0 | Status: SHIPPED | OUTPATIENT
Start: 2021-04-11 | End: 2021-06-17

## 2021-04-17 ENCOUNTER — PATIENT MESSAGE (OUTPATIENT)
Dept: FAMILY MEDICINE CLINIC | Facility: CLINIC | Age: 51
End: 2021-04-17

## 2021-04-17 DIAGNOSIS — G47.00 INSOMNIA, UNSPECIFIED TYPE: ICD-10-CM

## 2021-04-19 RX ORDER — ZOLPIDEM TARTRATE 10 MG/1
10 TABLET ORAL NIGHTLY
Qty: 30 TABLET | Refills: 0 | Status: SHIPPED | OUTPATIENT
Start: 2021-04-19 | End: 2021-09-13 | Stop reason: ALTCHOICE

## 2021-04-19 NOTE — TELEPHONE ENCOUNTER
From: Fadi Leonard  To: Satinder Santiago DO  Sent: 4/17/2021 5:33 PM CDT  Subject: Prescription Question    Good evening Dr. Harshal Hawkins. I need six zolpidem , because I'm currently out. A couple of days this month.  The medicine only allows me to sleep 4 hour

## 2021-04-23 RX ORDER — CHOLECALCIFEROL (VITAMIN D3) 1250 MCG
CAPSULE ORAL
Qty: 3 CAPSULE | Refills: 0 | Status: SHIPPED | OUTPATIENT
Start: 2021-04-23 | End: 2021-12-29

## 2021-04-23 RX ORDER — VALACYCLOVIR HYDROCHLORIDE 1 G/1
1 TABLET, FILM COATED ORAL DAILY
Qty: 21 TABLET | Refills: 1 | Status: SHIPPED | OUTPATIENT
Start: 2021-04-23 | End: 2021-09-13 | Stop reason: ALTCHOICE

## 2021-04-26 LAB — AMB EXT COVID-19 RESULT: DETECTED

## 2021-04-27 ENCOUNTER — TELEPHONE (OUTPATIENT)
Dept: FAMILY MEDICINE CLINIC | Facility: CLINIC | Age: 51
End: 2021-04-27

## 2021-04-29 NOTE — TELEPHONE ENCOUNTER
Positive Covid 4/26/21. Left message to call back. [employer] sent to patient. Transfer to triage.

## 2021-04-30 NOTE — TELEPHONE ENCOUNTER
What  was your temp today? - fever subsided    How did you take your temp?     without a thermometer    What was your pulse ox today? No pulse ox    Are you feeling short of breath today? No      Is the shortness of breath better, the same, or worse than yesterday? better    Are you having a cough today? Yes, but not bad    Is the cough better, the same, or worse than yesterday? better    Are you experiencing weakness today? Yes    Is the weakness better, the same or worse than yesterday?     worse    How is your appetite compared to yesterday?     worse    Are you vomiting? No    Any loss of taste or smell? No      Nursing notes:  Pt reports diarrhea today. Denies abdominal pain,  fever, shortness of breath, cough. Advised BRAT diet and push fluids including gatorade, avoid caffeine and spicy foods. Encouraged to take probiotics. Advised ER for worsening symptoms. Informed Pt we will be calling him back tomorrow for update. He verbalized understanding.

## 2021-05-01 NOTE — TELEPHONE ENCOUNTER
What  was your temp today? Didn't take yet    How did you take your temp? Touchless/ oral    What was your pulse ox today? Doesn't have one, ordered off [employer],should receive it soon,     Are you feeling short of breath today? Yes    Is the shortness of breath better, the same, or worse than yesterday?   about the same    Are you having a cough today? No, but intermittently dry      Is the cough better, the same, or worse than yesterday? Are you experiencing weakness today? Yes    Is the weakness better, the same or worse than yesterday?     about the same    How is your appetite compared to yesterday? No appetite    Are you vomiting? No, but has diarrhea since 4/22     Any loss of taste or smell? No      Nursing notes:  Advised gatorade/ pedialyte, BRAT diet, push water/ fluids, otc immodium for the diarrhea. Advised to make sure he takes his temp 2x daily, Taking tylenol for headaches,body aches. Advised ER/911 if worsening shortness of breath or chest pain.      Telemed scheduled for Monday at 3 pm

## 2021-05-03 ENCOUNTER — VIRTUAL PHONE E/M (OUTPATIENT)
Dept: FAMILY MEDICINE CLINIC | Facility: CLINIC | Age: 51
End: 2021-05-03

## 2021-05-03 DIAGNOSIS — U07.1 COVID-19 VIRUS INFECTION: Primary | ICD-10-CM

## 2021-05-03 PROCEDURE — 99213 OFFICE O/P EST LOW 20 MIN: CPT | Performed by: FAMILY MEDICINE

## 2021-05-03 NOTE — TELEPHONE ENCOUNTER
What  was your temp today? - did not take temperature today, yesterday temperature was 97. How did you take your temp?     with an oral thermometer    What was your pulse ox today? Does not have one    Are you feeling short of breath today? Yes, gets short of breath only with coughing spasms. Is the shortness of breath better, the same, or worse than yesterday?   about the same    Are you having a cough today? Yes    Is the cough better, the same, or worse than yesterday?    about the same    Are you experiencing weakness today? Yes    Is the weakness better, the same or worse than yesterday?     about the same    How is your appetite compared to yesterday?     about the same, does not have an appetite. Forcing himself to eat. Are you vomiting? No    Any loss of taste or smell? No      Nursing notes:  Patient stated that today started up again with the diarrhea. Has one bout of watery diarrhea today. No chest pain. No other symptoms. Advised patient to push fluids with electrolytes such as pedialyte/Gatorade and rest. If any increased shortness of breath or chest pain to go to the ER. Otherwise we will follow-up with him tomorrow. Patient agreed.      Patient has a televisit with Dr Delicia Slade today at 3pm.

## 2021-05-03 NOTE — PATIENT INSTRUCTIONS
The pulseThe patient has been instructed to continue to monitor his oxygenation oximeter. The goal is to try to stay above 97%.   Although the patient's appetite is not very good, the patient has been encouraged to continue to hydrate with clear water and

## 2021-05-03 NOTE — PROGRESS NOTES
Virtual Telephone Check-In    Carolina Lazaro verbally consents to a Virtual/Telephone Check-In visit on 05/03/21. Patient understands and accepts financial responsibility for any deductible, co-insurance and/or co-pays associated with this service. following visit was completed using two-way, real-time interactive audio and/or video communication.   This has been done in good elroy to provide continuity of care in the best interest of the provider-patient relationship, due to the ongoing public health

## 2021-05-04 NOTE — TELEPHONE ENCOUNTER
What  was your temp today? - did not check temperature today    How did you take your temp?     n/a    What was your pulse ox today? Did not check today, spouse checked yesterday but not sure what it was. Will have spouse check it later today. Are you feeling short of breath today? Yes    Is the shortness of breath better, the same, or worse than yesterday? better    Are you having a cough today? No, not really coughing today    Is the cough better, the same, or worse than yesterday? better    Are you experiencing weakness today? No      Is the weakness better, the same or worse than yesterday? better    How is your appetite compared to yesterday? better, patient states currently hungry and waiting on his food. After patient eats states will try to walk for alittle bit. Are you vomiting? No    Any loss of taste or smell? No      Nursing notes: Patient states that feeling better today. No chest pain. No other symptoms. Advised patient to continue to push fluids, walk, and follow Dr Prosper He recommendations from yesterday's televisit. If any increased shortness of breath or chest pain to go to the ER. Otherwise, we will follow-up with him tomorrow. Patient agreed.

## 2021-05-05 NOTE — TELEPHONE ENCOUNTER
What  was your temp today? - have not checked today, yesterday it was normal    How did you take your temp?     n/a    What was your pulse ox today? \"have not checked today, but yesterday was normal\"    Are you feeling short of breath today? \"not today\"    Is the shortness of breath better, the same, or worse than yesterday? \"better\"    Are you having a cough today? Not today    Is the cough better, the same, or worse than yesterday? better     Are you experiencing weakness today? \"no, I went for a walk yesterday, and I am going for a walk today also\"    Is the weakness better, the same or worse than yesterday? No weakness    How is your appetite compared to yesterday? Appetite is good    Are you vomiting? No     Any loss of taste or smell? No/no      Nursing notes:  Pt states he is feeling well, diarrhea has resolved. Pt instructed to go to ER if difficulty breathing, weakness, chest pain and pt agrees to plan.

## 2021-06-17 DIAGNOSIS — G47.00 INSOMNIA, UNSPECIFIED TYPE: ICD-10-CM

## 2021-06-17 RX ORDER — ZOLPIDEM TARTRATE 10 MG/1
TABLET ORAL
Qty: 30 TABLET | Refills: 0 | Status: SHIPPED | OUTPATIENT
Start: 2021-06-17 | End: 2021-07-13

## 2021-07-13 DIAGNOSIS — G47.00 INSOMNIA, UNSPECIFIED TYPE: ICD-10-CM

## 2021-07-13 RX ORDER — ZOLPIDEM TARTRATE 10 MG/1
TABLET ORAL
Qty: 30 TABLET | Refills: 0 | Status: SHIPPED | OUTPATIENT
Start: 2021-07-13 | End: 2021-08-09

## 2021-08-09 DIAGNOSIS — G47.00 INSOMNIA, UNSPECIFIED TYPE: ICD-10-CM

## 2021-08-09 RX ORDER — ZOLPIDEM TARTRATE 10 MG/1
TABLET ORAL
Qty: 30 TABLET | Refills: 0 | Status: SHIPPED | OUTPATIENT
Start: 2021-08-09 | End: 2021-09-09

## 2021-09-09 DIAGNOSIS — G47.00 INSOMNIA, UNSPECIFIED TYPE: ICD-10-CM

## 2021-09-09 RX ORDER — ZOLPIDEM TARTRATE 10 MG/1
10 TABLET ORAL NIGHTLY
Qty: 30 TABLET | Refills: 0 | Status: SHIPPED | OUTPATIENT
Start: 2021-09-09 | End: 2021-09-13 | Stop reason: ALTCHOICE

## 2021-09-09 RX ORDER — ZOLPIDEM TARTRATE 10 MG/1
TABLET ORAL
Qty: 30 TABLET | Refills: 0 | Status: SHIPPED | OUTPATIENT
Start: 2021-09-09 | End: 2021-10-05

## 2021-09-09 NOTE — TELEPHONE ENCOUNTER
Please review. Protocol failed/ No protocol      Requested Prescriptions   Pending Prescriptions Disp Refills    ZOLPIDEM 10 MG Oral Tab [Pharmacy Med Name: ZOLPIDEM 10MG TABLETS] 30 tablet 0     Sig: TAKE 1 TABLET BY MOUTH EVERY NIGHT AT BEDTIME        The

## 2021-09-13 ENCOUNTER — OFFICE VISIT (OUTPATIENT)
Dept: FAMILY MEDICINE CLINIC | Facility: CLINIC | Age: 51
End: 2021-09-13
Payer: COMMERCIAL

## 2021-09-13 VITALS
TEMPERATURE: 97 F | BODY MASS INDEX: 37.09 KG/M2 | HEART RATE: 74 BPM | RESPIRATION RATE: 18 BRPM | SYSTOLIC BLOOD PRESSURE: 100 MMHG | DIASTOLIC BLOOD PRESSURE: 80 MMHG | WEIGHT: 222.63 LBS | HEIGHT: 65 IN

## 2021-09-13 DIAGNOSIS — M99.01 CERVICOTHORACIC SOMATIC DYSFUNCTION: Primary | ICD-10-CM

## 2021-09-13 DIAGNOSIS — Z00.00 ROUTINE PHYSICAL EXAMINATION: ICD-10-CM

## 2021-09-13 DIAGNOSIS — M99.03 SOMATIC DYSFUNCTION OF LUMBOSACRAL REGION: ICD-10-CM

## 2021-09-13 PROCEDURE — 99213 OFFICE O/P EST LOW 20 MIN: CPT | Performed by: FAMILY MEDICINE

## 2021-09-13 PROCEDURE — 3008F BODY MASS INDEX DOCD: CPT | Performed by: FAMILY MEDICINE

## 2021-09-13 PROCEDURE — 98926 OSTEOPATH MANJ 3-4 REGIONS: CPT | Performed by: FAMILY MEDICINE

## 2021-09-13 PROCEDURE — 3079F DIAST BP 80-89 MM HG: CPT | Performed by: FAMILY MEDICINE

## 2021-09-13 PROCEDURE — 3074F SYST BP LT 130 MM HG: CPT | Performed by: FAMILY MEDICINE

## 2021-09-13 PROCEDURE — 93000 ELECTROCARDIOGRAM COMPLETE: CPT | Performed by: FAMILY MEDICINE

## 2021-09-13 PROCEDURE — 99396 PREV VISIT EST AGE 40-64: CPT | Performed by: FAMILY MEDICINE

## 2021-09-13 NOTE — PROGRESS NOTES
Subjective:   Patient ID: Steve Diamond is a 46year old male.     This patient is a 52-year-old -American male here for complete preventive care physical and for status update on any confirmed chronic medical illnesses and follow up on any previou changes. Normal EKG. - CBC WITH DIFFERENTIAL WITH PLATELET  - TSH W REFLEX TO FREE T4  - URINALYSIS, ROUTINE  - PSA, TOTAL WITH REFLEX TO PSA, FREE  - LIPID PANEL    2.  Cervicothoracic somatic dysfunction  Osteopathic manipulation performed in 4 regions

## 2021-09-14 LAB
ABSOLUTE BASOPHILS: 67 CELLS/UL (ref 0–200)
ABSOLUTE EOSINOPHILS: 110 CELLS/UL (ref 15–500)
ABSOLUTE LYMPHOCYTES: 3312 CELLS/UL (ref 850–3900)
ABSOLUTE MONOCYTES: 500 CELLS/UL (ref 200–950)
ABSOLUTE NEUTROPHILS: 2111 CELLS/UL (ref 1500–7800)
APPEARANCE: CLEAR
BASOPHILS: 1.1 %
BILIRUBIN: NEGATIVE
CHOL/HDLC RATIO: 4.3 (CALC)
CHOLESTEROL, TOTAL: 191 MG/DL
COLOR: YELLOW
EOSINOPHILS: 1.8 %
GLUCOSE: NEGATIVE
HDL CHOLESTEROL: 44 MG/DL
HEMATOCRIT: 48.7 % (ref 38.5–50)
HEMOGLOBIN: 16.7 G/DL (ref 13.2–17.1)
KETONES: NEGATIVE
LDL-CHOLESTEROL: 120 MG/DL (CALC)
LEUKOCYTE ESTERASE: NEGATIVE
LYMPHOCYTES: 54.3 %
MCH: 32.1 PG (ref 27–33)
MCHC: 34.3 G/DL (ref 32–36)
MCV: 93.5 FL (ref 80–100)
MONOCYTES: 8.2 %
MPV: 10.6 FL (ref 7.5–12.5)
NEUTROPHILS: 34.6 %
NITRITE: NEGATIVE
NON-HDL CHOLESTEROL: 147 MG/DL (CALC)
OCCULT BLOOD: NEGATIVE
PH: 7 (ref 5–8)
PLATELET COUNT: 208 THOUSAND/UL (ref 140–400)
PROTEIN: NEGATIVE
RDW: 12.7 % (ref 11–15)
RED BLOOD CELL COUNT: 5.21 MILLION/UL (ref 4.2–5.8)
SPECIFIC GRAVITY: 1.02 (ref 1–1.03)
TOTAL PSA: 0.3 NG/ML
TRIGLYCERIDES: 152 MG/DL
TSH W/REFLEX TO FT4: 1.59 MIU/L (ref 0.4–4.5)
WHITE BLOOD CELL COUNT: 6.1 THOUSAND/UL (ref 3.8–10.8)

## 2021-10-05 DIAGNOSIS — G47.00 INSOMNIA, UNSPECIFIED TYPE: ICD-10-CM

## 2021-10-05 RX ORDER — ZOLPIDEM TARTRATE 10 MG/1
10 TABLET ORAL NIGHTLY
Qty: 30 TABLET | Refills: 0 | Status: SHIPPED | OUTPATIENT
Start: 2021-10-05 | End: 2021-12-01

## 2021-10-05 NOTE — TELEPHONE ENCOUNTER
Please review. Protocol failed / No protocol. Requested Prescriptions   Pending Prescriptions Disp Refills    zolpidem 10 MG Oral Tab 30 tablet 0     Sig: Take 1 tablet (10 mg total) by mouth nightly.         There is no refill protocol information for

## 2021-12-01 DIAGNOSIS — G47.00 INSOMNIA, UNSPECIFIED TYPE: ICD-10-CM

## 2021-12-01 RX ORDER — ZOLPIDEM TARTRATE 10 MG/1
10 TABLET ORAL NIGHTLY
Qty: 30 TABLET | Refills: 0 | Status: SHIPPED | OUTPATIENT
Start: 2021-12-01 | End: 2021-12-29

## 2021-12-01 NOTE — TELEPHONE ENCOUNTER
Please review. Protocol Failed / No Protocol.     Requested Prescriptions   Pending Prescriptions Disp Refills    ZOLPIDEM 10 MG Oral Tab [Pharmacy Med Name: ZOLPIDEM 10MG TABLETS] 30 tablet 0     Sig: TAKE 1 TABLET(10 MG) BY MOUTH EVERY NIGHT        There

## 2021-12-29 DIAGNOSIS — G47.00 INSOMNIA, UNSPECIFIED TYPE: ICD-10-CM

## 2021-12-29 RX ORDER — ZOLPIDEM TARTRATE 10 MG/1
10 TABLET ORAL NIGHTLY
Qty: 30 TABLET | Refills: 0 | Status: SHIPPED | OUTPATIENT
Start: 2021-12-29 | End: 2022-01-26

## 2021-12-29 RX ORDER — CHOLECALCIFEROL (VITAMIN D3) 1250 MCG
CAPSULE ORAL
Qty: 3 CAPSULE | Refills: 0 | Status: SHIPPED | OUTPATIENT
Start: 2021-12-29

## 2022-01-26 DIAGNOSIS — G47.00 INSOMNIA, UNSPECIFIED TYPE: ICD-10-CM

## 2022-01-26 RX ORDER — ZOLPIDEM TARTRATE 10 MG/1
10 TABLET ORAL NIGHTLY
Qty: 30 TABLET | Refills: 0 | Status: SHIPPED | OUTPATIENT
Start: 2022-01-26

## 2022-01-26 NOTE — TELEPHONE ENCOUNTER
Please review; protocol failed/no protocol.      Requested Prescriptions   Pending Prescriptions Disp Refills    ZOLPIDEM 10 MG Oral Tab [Pharmacy Med Name: ZOLPIDEM 10MG TABLETS] 30 tablet 0     Sig: TAKE 1 TABLET(10 MG) BY MOUTH EVERY NIGHT        There i

## 2022-02-23 NOTE — TELEPHONE ENCOUNTER
Please review. Protocol Failed or has No Protocol.     Requested Prescriptions   Pending Prescriptions Disp Refills    ZOLPIDEM 10 MG Oral Tab [Pharmacy Med Name: ZOLPIDEM 10MG TABLETS] 30 tablet 0     Sig: TAKE 1 TABLET(10 MG) BY MOUTH EVERY NIGHT        There is no refill protocol information for this order               Recent Outpatient Visits              5 months ago Cervicothoracic somatic dysfunction    [facility] Adam,     Office Visit    9 months ago [facility]-19 virus [facility]  [place]    [employer] [facility]    1 year ago Flu-like symptoms    [facility] [address] [place], [place] [address], [place]    Telemedicine    2 years ago Routine physical examination    [facility] Benedetta Adam, DO    Office Visit    3 years ago Routine physical examination    [facility] [address] [place], [place] [address], [place]    Office Visit

## 2022-02-24 RX ORDER — CHOLECALCIFEROL (VITAMIN D3) 1250 MCG
CAPSULE ORAL
Qty: 3 CAPSULE | Refills: 0 | Status: SHIPPED | OUTPATIENT
Start: 2022-02-24

## 2022-02-24 RX ORDER — ZOLPIDEM TARTRATE 10 MG/1
10 TABLET ORAL NIGHTLY
Qty: 30 TABLET | Refills: 0 | Status: SHIPPED | OUTPATIENT
Start: 2022-02-24

## 2022-02-24 NOTE — TELEPHONE ENCOUNTER
Please review. Protocol failed or does not have a protocol.      Requested Prescriptions   Pending Prescriptions Disp Refills    Cholecalciferol (VITAMIN D3) 1.25 MG (46280 UT) Oral Cap 3 capsule 0     Sig: TAKE ONE CAPSULE BY MOUTH EVERY 30 DAYS        There is no refill protocol information for this order           Recent Outpatient Visits              5 months ago Cervicothoracic somatic dysfunction    [address] Alexandra, DO    Office Visit    9 months ago COVID-19 virus infection    [address] [place] [facility] Alexandra, DO    [employer] E/M    1 year ago Flu-like symptoms    [address] [facility] Alexandra, DO    Telemedicine    2 years ago Routine physical examination    [address] [facility] Alexandra,     Office Visit    3 years ago Routine physical examination    [address] [facility] , [facility], [place]    Office Visit

## 2022-02-24 NOTE — TELEPHONE ENCOUNTER
June Denise pt calling for the status of his refill request. Pt was informed we are waiting for you to approve his refill request. Pt stated that he is out of medication.

## 2022-02-24 NOTE — TELEPHONE ENCOUNTER
Please review. Protocol failed or does not have a protocol. Requested Prescriptions   Pending Prescriptions Disp Refills    zolpidem 10 MG Oral Tab 30 tablet 0     Sig: Take 1 tablet (10 mg total) by mouth nightly.         There is no refill protocol information for this order           Recent Outpatient Visits              5 months ago Cervicothoracic somatic dysfunction    [facility] Rosetta Lux [place], DO    Office Visit    9 months ago COVID-19 virus infection    [facility] [place]    [employer] E/M    1 year ago Flu-like symptoms    [facility] [place]    Telemedicine    2 years ago Routine physical examination    [facility] [place]    Office Visit    3 years ago Routine physical examination    [facility] Jasonelmer [address] [place], [place] [address], [place]    Office Visit

## 2022-02-25 RX ORDER — ZOLPIDEM TARTRATE 10 MG/1
10 TABLET ORAL NIGHTLY
Qty: 30 TABLET | Refills: 0 | Status: SHIPPED | OUTPATIENT
Start: 2022-02-25 | End: 2022-03-16

## 2022-03-14 ENCOUNTER — TELEPHONE (OUTPATIENT)
Dept: FAMILY MEDICINE CLINIC | Facility: CLINIC | Age: 52
End: 2022-03-14

## 2022-03-17 RX ORDER — ZOLPIDEM TARTRATE 10 MG/1
10 TABLET ORAL NIGHTLY
Qty: 30 TABLET | Refills: 0 | Status: SHIPPED | OUTPATIENT
Start: 2022-03-17 | End: 2022-04-04

## 2022-03-17 NOTE — TELEPHONE ENCOUNTER
Please review; protocol failed. Or has no protocol    Requested Prescriptions   Pending Prescriptions Disp Refills    zolpidem 10 MG Oral Tab 30 tablet 0     Sig: Take 1 tablet (10 mg total) by mouth nightly.         There is no refill protocol information for this order            Recent Outpatient Visits              6 months ago Cervicothoracic somatic dysfunction    [address] [place], [address], [facility],     Office Visit    10 months ago COVID-19 virus infection    [address] [place], [address], [facility],     [employer] E/M    1 year ago Flu-like symptoms    [address] [place], [address], [facility],     Telemedicine    2 years ago Routine physical examination    [address] [place], [address], [facility],     Office Visit    3 years ago Routine physical examination    [address] [place], [address], [facility], [place]    Office Visit

## 2022-04-04 ENCOUNTER — PATIENT MESSAGE (OUTPATIENT)
Dept: FAMILY MEDICINE CLINIC | Facility: CLINIC | Age: 52
End: 2022-04-04

## 2022-04-04 RX ORDER — ZOLPIDEM TARTRATE 10 MG/1
10 TABLET ORAL NIGHTLY
Qty: 30 TABLET | Refills: 5 | Status: SHIPPED | OUTPATIENT
Start: 2022-04-04

## 2022-04-04 NOTE — TELEPHONE ENCOUNTER
No response has been received. Will follow up. Called [phone] to speak to [employer]  They are unable to locate patient in their system. PA thru Epic     Will contact pharmacy to confirm what they have on file. Pharmacy confirmed insurance. Bin [id number]  Pcn ADV  Grp [id number]    NF#[phone] [facility] Helpdesk- stated they do not handle this request    OM#[phone] RX Benefits   Confirm they handle the PA requests for patient. Have to initiate thru rxb. [employer]     Prior Authorization request details:  Prior Auth (EOC) ID: [id number] Drug/Service Name: ZOLPIDEM TARTRATE 10 MG TABLET  Patient: Brisa Jones Date Requested: 2022 10:30:24 AM    MemberID: [id number] : 1970    Await outcome.

## 2022-04-04 NOTE — TELEPHONE ENCOUNTER
zolpidem 10 MG Oral Tab 30 tablet 0 3/17/2022     Sig - Route: Take 1 tablet (10 mg total) by mouth nightly.  - Oral    Sent to pharmacy as: Zolpidem Tartrate 10 MG Oral Tablet (AMBIEN)    E-Prescribing Status: Receipt confirmed by pharmacy (3/17/2022 11:50 AM CDT)    Prior authorization: Approved

## 2022-04-21 ENCOUNTER — NURSE TRIAGE (OUTPATIENT)
Dept: FAMILY MEDICINE CLINIC | Facility: CLINIC | Age: 52
End: 2022-04-21

## 2022-04-22 ENCOUNTER — APPOINTMENT (OUTPATIENT)
Dept: ULTRASOUND IMAGING | Facility: HOSPITAL | Age: 52
End: 2022-04-22
Attending: EMERGENCY MEDICINE
Payer: COMMERCIAL

## 2022-04-22 ENCOUNTER — HOSPITAL ENCOUNTER (EMERGENCY)
Facility: HOSPITAL | Age: 52
Discharge: HOME | End: 2022-04-22
Attending: EMERGENCY MEDICINE
Payer: COMMERCIAL

## 2022-04-22 VITALS
WEIGHT: 220 LBS | DIASTOLIC BLOOD PRESSURE: 80 MMHG | HEART RATE: 72 BPM | SYSTOLIC BLOOD PRESSURE: 113 MMHG | OXYGEN SATURATION: 100 % | RESPIRATION RATE: 18 BRPM | HEIGHT: 65 IN | TEMPERATURE: 97 F | BODY MASS INDEX: 36.65 KG/M2

## 2022-04-22 DIAGNOSIS — N45.1 EPIDIDYMITIS: Primary | ICD-10-CM

## 2022-04-22 LAB
BILIRUB UR QL: NEGATIVE
COLOR UR: YELLOW
GLUCOSE UR-MCNC: NEGATIVE MG/DL
HGB UR QL STRIP.AUTO: NEGATIVE
KETONES UR-MCNC: NEGATIVE MG/DL
LEUKOCYTE ESTERASE UR QL STRIP.AUTO: NEGATIVE
NITRITE UR QL STRIP.AUTO: NEGATIVE
PH UR: 7 [PH] (ref 5–8)
PROT UR-MCNC: NEGATIVE MG/DL
SP GR UR STRIP: 1.02 (ref 1–1.03)
UROBILINOGEN UR STRIP-ACNC: 2
VIT C UR-MCNC: NEGATIVE MG/DL

## 2022-04-22 PROCEDURE — 76870 US EXAM SCROTUM: CPT | Performed by: EMERGENCY MEDICINE

## 2022-04-22 PROCEDURE — 99284 EMERGENCY DEPT VISIT MOD MDM: CPT

## 2022-04-22 PROCEDURE — 81003 URINALYSIS AUTO W/O SCOPE: CPT | Performed by: EMERGENCY MEDICINE

## 2022-04-22 PROCEDURE — 93975 VASCULAR STUDY: CPT | Performed by: EMERGENCY MEDICINE

## 2022-04-22 RX ORDER — LEVOFLOXACIN 500 MG/1
500 TABLET, FILM COATED ORAL DAILY
Qty: 10 TABLET | Refills: 0 | Status: SHIPPED | OUTPATIENT
Start: 2022-04-22 | End: 2022-05-02

## 2022-04-22 RX ORDER — LEVOFLOXACIN 500 MG/1
500 TABLET, FILM COATED ORAL ONCE
Status: COMPLETED | OUTPATIENT
Start: 2022-04-22 | End: 2022-04-22

## 2022-04-22 NOTE — ED INITIAL ASSESSMENT (HPI)
Pt c/o left testicular swelling since Tuesday. Pt denies urinary symptoms. Pt unsure if it's due to picking up laundry basket.

## 2022-05-16 ENCOUNTER — HOSPITAL ENCOUNTER (EMERGENCY)
Facility: HOSPITAL | Age: 52
Discharge: HOME | End: 2022-05-16
Attending: EMERGENCY MEDICINE
Payer: COMMERCIAL

## 2022-05-16 VITALS
SYSTOLIC BLOOD PRESSURE: 129 MMHG | HEART RATE: 69 BPM | WEIGHT: 215 LBS | RESPIRATION RATE: 18 BRPM | HEIGHT: 65 IN | BODY MASS INDEX: 35.82 KG/M2 | DIASTOLIC BLOOD PRESSURE: 88 MMHG | TEMPERATURE: 98 F | OXYGEN SATURATION: 96 %

## 2022-05-16 DIAGNOSIS — S16.1XXA STRAIN OF NECK MUSCLE, INITIAL ENCOUNTER: Primary | ICD-10-CM
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
## 2022-05-16 PROCEDURE — 99283 EMERGENCY DEPT VISIT LOW MDM: CPT

## 2022-05-16 RX ORDER — CYCLOBENZAPRINE HCL 10 MG
10 TABLET ORAL 3 TIMES DAILY PRN
Qty: 20 TABLET | Refills: 0 | Status: SHIPPED | OUTPATIENT
Start: 2022-05-16 | End: 2022-05-23

## 2022-05-16 RX ORDER — KETOROLAC TROMETHAMINE 10 MG/1
10 TABLET, FILM COATED ORAL EVERY 6 HOURS PRN
Qty: 20 TABLET | Refills: 0 | Status: SHIPPED | OUTPATIENT
Start: 2022-05-16 | End: 2022-05-21

## 2022-05-16 RX ORDER — HYDROCODONE BITARTRATE AND ACETAMINOPHEN 5; 325 MG/1; MG/1
1 TABLET ORAL EVERY 6 HOURS PRN
Qty: 15 TABLET | Refills: 0 | Status: SHIPPED | OUTPATIENT
Start: 2022-05-16

## 2022-05-16 RX ORDER — HYDROCODONE BITARTRATE AND ACETAMINOPHEN 5; 325 MG/1; MG/1
1 TABLET ORAL ONCE
Status: COMPLETED | OUTPATIENT
Start: 2022-05-16 | End: 2022-05-16

## 2022-05-16 RX ORDER — IBUPROFEN 600 MG/1
600 TABLET ORAL ONCE
Status: COMPLETED | OUTPATIENT
Start: 2022-05-16 | End: 2022-05-16

## 2022-05-17 NOTE — ED INITIAL ASSESSMENT (HPI)
Pt to ED with c/o left neck pain for 3 days. Pt states he slipped and fell down a few stairs about one week ago. Pt denies hitting head or LOC. Pt denies thinners or asa. Pt denies fever. Pt states no improvement with tylenol.  No cervical spine tenderness with palpation

## 2022-06-29 DIAGNOSIS — G47.00 INSOMNIA, UNSPECIFIED TYPE: ICD-10-CM

## 2022-06-30 RX ORDER — ZOLPIDEM TARTRATE 10 MG/1
TABLET ORAL
Qty: 30 TABLET | Refills: 0 | Status: SHIPPED | OUTPATIENT
Start: 2022-06-30

## 2022-07-01 DIAGNOSIS — G47.00 INSOMNIA, UNSPECIFIED TYPE: ICD-10-CM

## 2022-07-05 RX ORDER — ZOLPIDEM TARTRATE 10 MG/1
TABLET ORAL
Qty: 30 TABLET | Refills: 0 | Status: SHIPPED | OUTPATIENT
Start: 2022-07-05

## 2022-07-11 ENCOUNTER — TELEPHONE (OUTPATIENT)
Dept: GASTROENTEROLOGY | Facility: CLINIC | Age: 52
End: 2022-07-11

## 2022-07-11 NOTE — TELEPHONE ENCOUNTER
----- Message from Curtis Vences CMA sent at 2017 12:12 PM CDT -----  Regardin yr CLN recall  Recall colon in 5 years per.  Colon done 17

## 2022-09-27 ENCOUNTER — TELEPHONE (OUTPATIENT)
Dept: FAMILY MEDICINE CLINIC | Facility: CLINIC | Age: 52
End: 2022-09-27

## 2022-09-27 DIAGNOSIS — G47.00 INSOMNIA, UNSPECIFIED TYPE: ICD-10-CM

## 2022-09-27 RX ORDER — ZOLPIDEM TARTRATE 10 MG/1
10 TABLET ORAL NIGHTLY
Qty: 30 TABLET | Refills: 0 | Status: SHIPPED | OUTPATIENT
Start: 2022-09-27

## 2022-09-27 NOTE — TELEPHONE ENCOUNTER
Please let patient know refill sent to pharmacy but definitely do not recommend taking more than 1 tablet nightly.

## 2022-09-27 NOTE — TELEPHONE ENCOUNTER
Patient requesting refill on Zolipidem 10 mg. States he sometimes has to take two tablets to help him sleep since he works at night. Patient states feels he may need to increase dose of Zolpidem as he only sleeps for 4 hours with the current dose. States he is out of script and unable to fill script until 9/30/22. Which is why he is requesting an increase on script to be sent to pharmacy as he states he will need it tomorrow. . Patient has appointment scheduled for 10/11/22.

## 2022-09-27 NOTE — TELEPHONE ENCOUNTER
Left message on voicemail that script was sent, do not take more than one per night. MyChart message also sent.

## 2022-10-25 ENCOUNTER — TELEPHONE (OUTPATIENT)
Dept: FAMILY MEDICINE CLINIC | Facility: CLINIC | Age: 52
End: 2022-10-25

## 2022-10-25 NOTE — TELEPHONE ENCOUNTER
Pt initially had a follow-up appointment with Dr. Zev Pink on Thursday to discuss sleeping pill. Appointment was canceled due to change of Dr. Sheri Amaro schedule. Pt states he has 1 pill left of Zolpidem. 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 4 days on and 4 days on and 4 days off. Pt states on his days off, he goes to sleep at night with family, needs to take sleeping pill at night. When it is time for him to work a night shift, Pt takes zolpidem around 10am to sleep so that he is able to work starting at [address] [employer] states he only gets about 4 hours of sleep, his mind wanders. Some days Pt need to take 2 pills in 1 day, however, only 4x a month  4x a month up to 2 pills in one day  Pt states he is always short on pills and sometimes have to pay out of pocket as insurance will not cover for early refill. Advised appointment with Dr. Con Sue to discuss sleep medication or other alternative. Pt agrees and scheduled for tomorrow.     Future Appointments   Date Time Provider Efrain Martinez   10/26/2022 11:00 AM Hank Goldstein  [address] [place] [address]

## 2022-10-26 ENCOUNTER — TELEPHONE (OUTPATIENT)
Dept: FAMILY MEDICINE CLINIC | Facility: CLINIC | Age: 52
End: 2022-10-26

## 2022-10-26 ENCOUNTER — TELEMEDICINE (OUTPATIENT)
Dept: FAMILY MEDICINE CLINIC | Facility: CLINIC | Age: 52
End: 2022-10-26

## 2022-10-26 DIAGNOSIS — G47.26 SHIFT WORK SLEEP DISORDER: Primary | ICD-10-CM

## 2022-10-26 DIAGNOSIS — F43.9 STRESS: ICD-10-CM

## 2022-10-26 PROCEDURE — 99213 OFFICE O/P EST LOW 20 MIN: CPT | Performed by: FAMILY MEDICINE

## 2022-10-26 RX ORDER — ZOLPIDEM TARTRATE 12.5 MG/1
12.5 TABLET, FILM COATED, EXTENDED RELEASE ORAL NIGHTLY PRN
Qty: 30 TABLET | Refills: 1 | Status: SHIPPED | OUTPATIENT
Start: 2022-10-26

## 2022-11-30 ENCOUNTER — OFFICE VISIT (OUTPATIENT)
Dept: FAMILY MEDICINE CLINIC | Facility: CLINIC | Age: 52
End: 2022-11-30
Payer: COMMERCIAL

## 2022-11-30 VITALS
HEIGHT: 66.54 IN | HEART RATE: 81 BPM | WEIGHT: 224 LBS | DIASTOLIC BLOOD PRESSURE: 80 MMHG | SYSTOLIC BLOOD PRESSURE: 124 MMHG | BODY MASS INDEX: 35.57 KG/M2 | TEMPERATURE: 98 F

## 2022-11-30 DIAGNOSIS — M54.12 CERVICAL RADICULOPATHY: ICD-10-CM

## 2022-11-30 DIAGNOSIS — R35.0 BENIGN PROSTATIC HYPERPLASIA WITH URINARY FREQUENCY: ICD-10-CM

## 2022-11-30 DIAGNOSIS — R07.9 RIGHT-SIDED CHEST PAIN: ICD-10-CM

## 2022-11-30 DIAGNOSIS — M79.601 RIGHT ARM PAIN: ICD-10-CM

## 2022-11-30 DIAGNOSIS — E66.9 OBESITY, CLASS II, BMI 35-39.9: ICD-10-CM

## 2022-11-30 DIAGNOSIS — Z86.010 HISTORY OF ADENOMATOUS POLYP OF COLON: ICD-10-CM

## 2022-11-30 DIAGNOSIS — G47.00 INSOMNIA, UNSPECIFIED TYPE: ICD-10-CM

## 2022-11-30 DIAGNOSIS — Z00.00 ROUTINE PHYSICAL EXAMINATION: Primary | ICD-10-CM

## 2022-11-30 DIAGNOSIS — G47.26 SHIFT WORK SLEEP DISORDER: ICD-10-CM

## 2022-11-30 DIAGNOSIS — N40.1 BENIGN PROSTATIC HYPERPLASIA WITH URINARY FREQUENCY: ICD-10-CM

## 2022-11-30 PROBLEM — Z12.11 SCREEN FOR COLON CANCER: Status: RESOLVED | Noted: 2017-07-10 | Resolved: 2022-11-30

## 2022-11-30 PROBLEM — K62.5 RECTAL BLEEDING: Status: RESOLVED | Noted: 2017-07-10 | Resolved: 2022-11-30

## 2022-11-30 PROBLEM — Z86.0101 HISTORY OF ADENOMATOUS POLYP OF COLON: Status: ACTIVE | Noted: 2022-11-30

## 2022-11-30 PROCEDURE — 90471 IMMUNIZATION ADMIN: CPT | Performed by: FAMILY MEDICINE

## 2022-11-30 PROCEDURE — 3008F BODY MASS INDEX DOCD: CPT | Performed by: FAMILY MEDICINE

## 2022-11-30 PROCEDURE — 3074F SYST BP LT 130 MM HG: CPT | Performed by: FAMILY MEDICINE

## 2022-11-30 PROCEDURE — 90715 TDAP VACCINE 7 YRS/> IM: CPT | Performed by: FAMILY MEDICINE

## 2022-11-30 PROCEDURE — 3079F DIAST BP 80-89 MM HG: CPT | Performed by: FAMILY MEDICINE

## 2022-11-30 PROCEDURE — 99396 PREV VISIT EST AGE 40-64: CPT | Performed by: FAMILY MEDICINE

## 2022-11-30 PROCEDURE — 90750 HZV VACC RECOMBINANT IM: CPT | Performed by: FAMILY MEDICINE

## 2022-11-30 PROCEDURE — 90686 IIV4 VACC NO PRSV 0.5 ML IM: CPT | Performed by: FAMILY MEDICINE

## 2022-11-30 PROCEDURE — 90472 IMMUNIZATION ADMIN EACH ADD: CPT | Performed by: FAMILY MEDICINE

## 2022-11-30 RX ORDER — ZOSTER VACCINE RECOMBINANT, ADJUVANTED 50 MCG/0.5
1 KIT INTRAMUSCULAR ONCE
Qty: 1 EACH | Refills: 1 | Status: SHIPPED | OUTPATIENT
Start: 2022-11-30 | End: 2022-11-30

## 2022-12-06 ENCOUNTER — NURSE TRIAGE (OUTPATIENT)
Dept: FAMILY MEDICINE CLINIC | Facility: CLINIC | Age: 52
End: 2022-12-06

## 2022-12-06 DIAGNOSIS — M25.511 CHRONIC RIGHT SHOULDER PAIN: ICD-10-CM

## 2022-12-06 DIAGNOSIS — G89.29 CHRONIC RIGHT SHOULDER PAIN: ICD-10-CM

## 2022-12-06 DIAGNOSIS — M79.601 RIGHT ARM PAIN: Primary | ICD-10-CM

## 2022-12-06 NOTE — TELEPHONE ENCOUNTER
Pt / wife requesting xray of right shoulder , had OV 11/30/22, pain continues with lifting, not as bad at rest but pain is there 3/10 at rest, 6/10 with movement   Have not started PT d/t waiting on PA

## 2022-12-13 DIAGNOSIS — G47.00 INSOMNIA, UNSPECIFIED TYPE: ICD-10-CM

## 2022-12-13 NOTE — TELEPHONE ENCOUNTER
Please review. Protocol failed or has no protocol.     Requested Prescriptions   Pending Prescriptions Disp Refills    ZOLPIDEM 10 MG Oral Tab [Pharmacy Med Name: ZOLPIDEM 10MG TABLETS] 30 tablet 0     Sig: TAKE 1 TABLET(10 MG) BY MOUTH EVERY NIGHT       There is no refill protocol information for this order          Recent Outpatient Visits              1 week ago Routine physical examination    [facility], [place] [address], [address] [facility] MD [place]    Office Visit    1 month ago Shift work sleep disorder    [facility], [place] [address], [place]    Telemedicine    1 year ago Cervicothoracic somatic dysfunction    [facility], [place] [address], [place]    Office Visit    1 year ago COVID-19 virus infection    [facility], [place] [address], [place], Jessie Adams DO    [employer] E/M    1 year ago Flu-like symptoms    [facility], [place] [address], [place], Jessie Adams DO    Telemedicine            Future Appointments         Provider Department Appt Notes    In 2 weeks Trever Samuel, [address] [place] PPO    In 3 weeks Trever Samuel, [address] [place] PPO    In 1 month Trever Samuel, [address] [place] PPO    In 1 month Trever Samuel, [address] [place] PPO    In 1 month Trever Samuel, [address] [place] PPO    In 2 months Trever Samuel, [address] [place] PPO    In 2 months Trever Samuel, [address] [place] PPO    In 2 months Trever Samuel, [address] PPO

## 2022-12-14 RX ORDER — ZOLPIDEM TARTRATE 10 MG/1
TABLET ORAL
Qty: 30 TABLET | Refills: 3 | Status: SHIPPED | OUTPATIENT
Start: 2022-12-14 | End: 2022-12-15 | Stop reason: CLARIF

## 2022-12-15 RX ORDER — ZOLPIDEM TARTRATE 12.5 MG/1
12.5 TABLET, FILM COATED, EXTENDED RELEASE ORAL NIGHTLY PRN
Qty: 30 TABLET | Refills: 3 | Status: SHIPPED | OUTPATIENT
Start: 2022-12-15

## 2022-12-19 ENCOUNTER — TELEPHONE (OUTPATIENT)
Dept: PHYSICAL THERAPY | Facility: HOSPITAL | Age: 52
End: 2022-12-19

## 2022-12-29 ENCOUNTER — TELEPHONE (OUTPATIENT)
Dept: PHYSICAL THERAPY | Facility: HOSPITAL | Age: 52
End: 2022-12-29

## 2022-12-30 ENCOUNTER — APPOINTMENT (OUTPATIENT)
Dept: PHYSICAL THERAPY | Facility: HOSPITAL | Age: 52
End: 2022-12-30
Attending: FAMILY MEDICINE
Payer: COMMERCIAL

## 2023-01-06 ENCOUNTER — APPOINTMENT (OUTPATIENT)
Dept: PHYSICAL THERAPY | Facility: HOSPITAL | Age: 53
End: 2023-01-06
Attending: FAMILY MEDICINE
Payer: COMMERCIAL

## 2023-01-16 ENCOUNTER — APPOINTMENT (OUTPATIENT)
Dept: PHYSICAL THERAPY | Facility: HOSPITAL | Age: 53
End: 2023-01-16
Attending: FAMILY MEDICINE
Payer: COMMERCIAL

## 2023-01-23 ENCOUNTER — TELEPHONE (OUTPATIENT)
Dept: INTERNAL MEDICINE CLINIC | Facility: CLINIC | Age: 53
End: 2023-01-23

## 2023-01-23 ENCOUNTER — APPOINTMENT (OUTPATIENT)
Dept: PHYSICAL THERAPY | Facility: HOSPITAL | Age: 53
End: 2023-01-23
Attending: FAMILY MEDICINE
Payer: COMMERCIAL

## 2023-01-23 NOTE — TELEPHONE ENCOUNTER
Shift work sleep disorder G47.26     Insomnia, unspecified type G47.00    rx benefits [phone]    Completed on [email or web address]  Prior Auth (EOC) ID:  [id number]        You can check the status of your request using the Check Status link. Please note down (Prior Auth EOC ID) to check status.   Prior Authorization request details:  Prior Auth (EOC) ID: [id number] Drug/Service Name: ZOLPIDEM TART ER 12.5 MG TAB  Patient: Singh Santoyo Date Requested: 2023 1:20:59 PM    MemberID: [id number] : 1970    Await outcome

## 2023-01-23 NOTE — TELEPHONE ENCOUNTER
Need prior authorization for Zolpidem 12.5 mg tablets. Please call plan at [phone], to initiate a prior auth. Patient ID# [id number].

## 2023-02-06 ENCOUNTER — APPOINTMENT (OUTPATIENT)
Dept: PHYSICAL THERAPY | Facility: HOSPITAL | Age: 53
End: 2023-02-06
Attending: FAMILY MEDICINE
Payer: COMMERCIAL

## 2023-02-16 ENCOUNTER — OFFICE VISIT (OUTPATIENT)
Dept: SURGERY | Facility: CLINIC | Age: 53
End: 2023-02-16

## 2023-02-16 VITALS
BODY MASS INDEX: 34.72 KG/M2 | WEIGHT: 216 LBS | DIASTOLIC BLOOD PRESSURE: 80 MMHG | SYSTOLIC BLOOD PRESSURE: 110 MMHG | HEART RATE: 85 BPM | HEIGHT: 66 IN

## 2023-02-16 DIAGNOSIS — N50.819 TESTICULAR DISCOMFORT: ICD-10-CM

## 2023-02-16 DIAGNOSIS — N40.1 BPH WITH OBSTRUCTION/LOWER URINARY TRACT SYMPTOMS: Primary | ICD-10-CM

## 2023-02-16 DIAGNOSIS — N13.8 BPH WITH OBSTRUCTION/LOWER URINARY TRACT SYMPTOMS: Primary | ICD-10-CM

## 2023-02-16 LAB
BILIRUBIN: NEGATIVE
GLUCOSE (URINE DIPSTICK): NEGATIVE MG/DL
KETONES (URINE DIPSTICK): NEGATIVE MG/DL
LEUKOCYTES: NEGATIVE
MULTISTIX LOT#: NORMAL NUMERIC
NITRITE, URINE: NEGATIVE
OCCULT BLOOD: NEGATIVE
PH, URINE: 7.5 (ref 4.5–8)
PROTEIN (URINE DIPSTICK): NEGATIVE MG/DL
SPECIFIC GRAVITY: 1.02 (ref 1–1.03)
URINE-COLOR: YELLOW
UROBILINOGEN,SEMI-QN: 0.2 MG/DL (ref 0–1.9)

## 2023-02-16 PROCEDURE — 3008F BODY MASS INDEX DOCD: CPT | Performed by: UROLOGY

## 2023-02-16 PROCEDURE — 3079F DIAST BP 80-89 MM HG: CPT | Performed by: UROLOGY

## 2023-02-16 PROCEDURE — 3074F SYST BP LT 130 MM HG: CPT | Performed by: UROLOGY

## 2023-02-16 PROCEDURE — 99244 OFF/OP CNSLTJ NEW/EST MOD 40: CPT | Performed by: UROLOGY

## 2023-02-16 PROCEDURE — 81002 URINALYSIS NONAUTO W/O SCOPE: CPT | Performed by: UROLOGY

## 2023-02-16 RX ORDER — TAMSULOSIN HYDROCHLORIDE 0.4 MG/1
0.4 CAPSULE ORAL DAILY
Qty: 30 CAPSULE | Refills: 3 | Status: SHIPPED | OUTPATIENT
Start: 2023-02-16

## 2023-02-17 ENCOUNTER — TELEPHONE (OUTPATIENT)
Dept: FAMILY MEDICINE CLINIC | Facility: CLINIC | Age: 53
End: 2023-02-17

## 2023-02-17 ENCOUNTER — APPOINTMENT (OUTPATIENT)
Dept: PHYSICAL THERAPY | Facility: HOSPITAL | Age: 53
End: 2023-02-17
Attending: FAMILY MEDICINE
Payer: COMMERCIAL

## 2023-02-17 NOTE — TELEPHONE ENCOUNTER
Patient is currently at [facility] and requesting labs ordered from 11/30/23 be faxed to [facility] at [phone]. Labs faxed as requested.

## 2023-02-18 LAB
ABSOLUTE BASOPHILS: 80 CELLS/UL (ref 0–200)
ABSOLUTE EOSINOPHILS: 68 CELLS/UL (ref 15–500)
ABSOLUTE LYMPHOCYTES: 3169 CELLS/UL (ref 850–3900)
ABSOLUTE MONOCYTES: 547 CELLS/UL (ref 200–950)
ABSOLUTE NEUTROPHILS: 1835 CELLS/UL (ref 1500–7800)
BASOPHILS: 1.4 %
EOSINOPHILS: 1.2 %
HEMATOCRIT: 51 % (ref 38.5–50)
HEMOGLOBIN: 17 G/DL (ref 13.2–17.1)
LYMPHOCYTES: 55.6 %
MCH: 31.1 PG (ref 27–33)
MCHC: 33.3 G/DL (ref 32–36)
MCV: 93.2 FL (ref 80–100)
MONOCYTES: 9.6 %
MPV: 10.1 FL (ref 7.5–12.5)
NEUTROPHILS: 32.2 %
PLATELET COUNT: 229 THOUSAND/UL (ref 140–400)
PSA, TOTAL: 0.42 NG/ML
RDW: 13.2 % (ref 11–15)
RED BLOOD CELL COUNT: 5.47 MILLION/UL (ref 4.2–5.8)
WHITE BLOOD CELL COUNT: 5.7 THOUSAND/UL (ref 3.8–10.8)

## 2023-02-24 ENCOUNTER — APPOINTMENT (OUTPATIENT)
Dept: PHYSICAL THERAPY | Facility: HOSPITAL | Age: 53
End: 2023-02-24
Attending: FAMILY MEDICINE
Payer: COMMERCIAL

## 2023-03-03 ENCOUNTER — APPOINTMENT (OUTPATIENT)
Dept: PHYSICAL THERAPY | Facility: HOSPITAL | Age: 53
End: 2023-03-03
Attending: FAMILY MEDICINE
Payer: COMMERCIAL

## 2023-04-12 RX ORDER — ZOLPIDEM TARTRATE 12.5 MG/1
12.5 TABLET, FILM COATED, EXTENDED RELEASE ORAL NIGHTLY PRN
Qty: 30 TABLET | Refills: 3 | Status: SHIPPED | OUTPATIENT
Start: 2023-04-12

## 2023-04-12 RX ORDER — CHOLECALCIFEROL (VITAMIN D3) 1250 MCG
CAPSULE ORAL
Qty: 3 CAPSULE | Refills: 0 | OUTPATIENT
Start: 2023-04-12

## 2023-04-12 RX ORDER — CHOLECALCIFEROL (VITAMIN D3) 1250 MCG
CAPSULE ORAL
Qty: 3 CAPSULE | Refills: 0 | Status: SHIPPED | OUTPATIENT
Start: 2023-04-12

## 2023-04-12 NOTE — TELEPHONE ENCOUNTER
Please review. Protocol failed/ No protocol      Requested Prescriptions   Pending Prescriptions Disp Refills    VITAMIN D3 1.25 MG (76436 UT) Oral Cap [Pharmacy Med Name: VITAMIN D3 50,000 IU (MARLEEN) CAP] 3 capsule 0     Sig: TAKE 1 CAPSULE BY MOUTH EVERY 30 DAYS       There is no refill protocol information for this order               Recent Outpatient Visits              1 month ago BPH with obstruction/lower urinary tract symptoms    [facility], [address] [facility], Grupo Singh MD    Office Visit    4 months ago Routine physical examination    [address], [address] [facility] MD [place]    Office Visit    5 months ago Shift work sleep disorder    [address] [facility],[address], [place] [address], [place], Alva Clay MD    Telemedicine    1 year ago Cervicothoracic somatic dysfunction    [facility], [address], Jessie Adams DO    Office Visit    1 year ago COVID-19 virus infection    [facility], [address], Jessie Adams DO    Virtual Phone E/M

## 2023-04-12 NOTE — TELEPHONE ENCOUNTER
Please review. Protocol failed / No protocol. Requested Prescriptions   Pending Prescriptions Disp Refills    Zolpidem Tartrate ER 12.5 MG Oral Tab CR 30 tablet 3     Sig: Take 1 tablet (12.5 mg total) by mouth nightly as needed for Sleep.        There is no refill protocol information for this order      Refused Prescriptions Disp Refills    Cholecalciferol (VITAMIN D3) 1.25 MG (83172 UT) Oral Cap 3 capsule 0     Sig: TAKE 1 CAPSULE BY MOUTH EVERY 30 DAYS       There is no refill protocol information for this order            Recent Outpatient Visits              1 month ago BPH with obstruction/lower urinary tract symptoms    [facility], [address], Lucina Nuñez MD    Office Visit    4 months ago Routine physical examination    [address] [facility], [address] [facility] MD [place]    Office Visit    5 months ago Shift work sleep disorder    [address] [facility], [facility] , [place] Eris Ramos MD    Telemedicine    1 year ago Cervicothoracic somatic dysfunction    [facility], [facility] , [place]    Office Visit    1 year ago COVID-19 virus infection    [facility], [facility] [place]    Virtual Phone E/M

## 2023-07-11 RX ORDER — ZOLPIDEM TARTRATE 12.5 MG/1
12.5 TABLET, FILM COATED, EXTENDED RELEASE ORAL NIGHTLY PRN
Qty: 30 TABLET | Refills: 3 | Status: SHIPPED | OUTPATIENT
Start: 2023-07-11

## 2023-07-11 RX ORDER — CHOLECALCIFEROL (VITAMIN D3) 1250 MCG
CAPSULE ORAL
Qty: 12 CAPSULE | Refills: 3 | Status: SHIPPED | OUTPATIENT
Start: 2023-07-11

## 2023-07-11 NOTE — TELEPHONE ENCOUNTER
Please review. Protocol failed or has no protocol.      Requested Prescriptions   Pending Prescriptions Disp Refills    VITAMIN D3 1.25 MG (57658 UT) Oral Cap [Pharmacy Med Name: VITAMIN D3 50,000 IU (MARLEEN) CAP] 3 capsule 0     Sig: TAKE 1 CAPSULE BY MOUTH EVERY 30 DAYS       There is no refill protocol information for this order          Recent Outpatient Visits              4 months ago BPH with obstruction/lower urinary tract symptoms    [facility], [address], Isaac Childress MD    Office Visit    7 months ago Routine physical examination    [address] [facility], Donte Hartman MD    Office Visit    8 months ago Shift work sleep disorder    [address] [facility] [place],[address], [place] [address], [place], Donte Hartman MD    Telemedicine    1 year ago Cervicothoracic somatic dysfunction    [facility], [facility] [address] [place], [place] [address]    Office Visit    2 years ago COVID-19 virus infection    [facility], [facility] [address] [place], [place] [address]    Virtual Phone E/M

## 2023-07-12 NOTE — TELEPHONE ENCOUNTER
Please review refill protocol failed/ no protocol  Requested Prescriptions   Pending Prescriptions Disp Refills    Zolpidem Tartrate ER 12.5 MG Oral Tab CR 30 tablet 3     Sig: Take 1 tablet (12.5 mg total) by mouth nightly as needed for Sleep.        There is no refill protocol information for this order

## 2023-08-06 DIAGNOSIS — N13.8 BPH WITH OBSTRUCTION/LOWER URINARY TRACT SYMPTOMS: ICD-10-CM

## 2023-08-06 DIAGNOSIS — N40.1 BPH WITH OBSTRUCTION/LOWER URINARY TRACT SYMPTOMS: ICD-10-CM

## 2023-08-07 RX ORDER — TAMSULOSIN HYDROCHLORIDE 0.4 MG/1
0.4 CAPSULE ORAL DAILY
Qty: 90 CAPSULE | Refills: 3 | Status: SHIPPED | OUTPATIENT
Start: 2023-08-07

## 2023-08-12 DIAGNOSIS — G47.00 INSOMNIA, UNSPECIFIED TYPE: ICD-10-CM

## 2023-08-14 RX ORDER — ZOLPIDEM TARTRATE 10 MG/1
10 TABLET ORAL NIGHTLY
Qty: 30 TABLET | Refills: 0 | OUTPATIENT
Start: 2023-08-14

## 2023-09-20 ENCOUNTER — OFFICE VISIT (OUTPATIENT)
Dept: FAMILY MEDICINE CLINIC | Facility: CLINIC | Age: 53
End: 2023-09-20

## 2023-09-20 VITALS
HEART RATE: 65 BPM | DIASTOLIC BLOOD PRESSURE: 74 MMHG | SYSTOLIC BLOOD PRESSURE: 115 MMHG | WEIGHT: 223.13 LBS | BODY MASS INDEX: 36 KG/M2 | TEMPERATURE: 97 F

## 2023-09-20 DIAGNOSIS — M65.4 DE QUERVAIN'S TENOSYNOVITIS, RIGHT: Primary | ICD-10-CM

## 2023-09-20 PROCEDURE — 3078F DIAST BP <80 MM HG: CPT | Performed by: FAMILY MEDICINE

## 2023-09-20 PROCEDURE — 3074F SYST BP LT 130 MM HG: CPT | Performed by: FAMILY MEDICINE

## 2023-09-20 PROCEDURE — 90750 HZV VACC RECOMBINANT IM: CPT | Performed by: FAMILY MEDICINE

## 2023-09-20 PROCEDURE — 90686 IIV4 VACC NO PRSV 0.5 ML IM: CPT | Performed by: FAMILY MEDICINE

## 2023-09-20 PROCEDURE — 90472 IMMUNIZATION ADMIN EACH ADD: CPT | Performed by: FAMILY MEDICINE

## 2023-09-20 PROCEDURE — 20550 NJX 1 TENDON SHEATH/LIGAMENT: CPT | Performed by: FAMILY MEDICINE

## 2023-09-20 PROCEDURE — 90471 IMMUNIZATION ADMIN: CPT | Performed by: FAMILY MEDICINE

## 2023-09-20 RX ORDER — TRIAMCINOLONE ACETONIDE 40 MG/ML
20 INJECTION, SUSPENSION INTRA-ARTICULAR; INTRAMUSCULAR ONCE
Status: COMPLETED | OUTPATIENT
Start: 2023-09-20 | End: 2023-09-20

## 2023-09-20 RX ORDER — IBUPROFEN 600 MG/1
600 TABLET ORAL EVERY 6 HOURS PRN
COMMUNITY

## 2023-09-20 RX ADMIN — TRIAMCINOLONE ACETONIDE 20 MG: 40 INJECTION, SUSPENSION INTRA-ARTICULAR; INTRAMUSCULAR at 11:11:00

## 2023-09-20 NOTE — PROGRESS NOTES
Subjective:   Patient ID: Ruel Garza. is a 52year old male. Right wrist pain for the past month as below    Pain        History/Other:   Review of Systems  Current Outpatient Medications   Medication Sig Dispense Refill    ibuprofen 600 MG Oral Tab Take 1 tablet (600 mg total) by mouth every 6 (six) hours as needed for Pain.      tamsulosin 0.4 MG Oral Cap Take 1 capsule (0.4 mg total) by mouth daily. TAKE 1/2 HOUR FOLLOWING THE SAME MEAL 90 capsule 3    Cholecalciferol (VITAMIN D3) 1.25 MG (47914 UT) Oral Cap TAKE 1 CAPSULE BY MOUTH EVERY 30 DAYS 12 capsule 3    Zolpidem Tartrate ER 12.5 MG Oral Tab CR Take 1 tablet (12.5 mg total) by mouth nightly as needed for Sleep. 30 tablet 3    HYDROcodone-acetaminophen 5-325 MG Oral Tab Take 1 tablet by mouth every 6 (six) hours as needed for Pain. (Patient not taking: Reported on 9/20/2023) 15 tablet 0     Allergies:No Known Allergies    Objective:   Physical Exam  Constitutional:       Appearance: Normal appearance. Musculoskeletal:      Comments: Right wrist with good flexion and extension but positive Finkelstein's test.  Tenderness over extensor pollicis tendons   Neurological:      Mental Status: He is alert. Assessment & Plan:   De Quervain's tenosynovitis, right  (primary encounter diagnosis)-symptoms started 1 month ago, may be worse with cell phone use, typing, cooking. Seen at immediate care 1 week ago. Provided with splint, NSAIDs. Still experiencing severe pain. Keeping him awake at night. Discussed diagnosis, options for treatment. Discussed potential risks and side effects of injection including change in pigment, weakening of the tendon, infection. Injection done today into extensor pollicis tendon sheath.   He will call if not improved in 1 week, consider orthopedics referral.    Orders Placed This Encounter      Zoster Recombinant Adjuvanted (Shingrix -Shingles) [21928]      Fluzone Quadrivalent 6mo+ 0.5mL      Meds This Visit:  Requested Prescriptions      No prescriptions requested or ordered in this encounter       Imaging & Referrals:  ZOSTER VACC RECOMBINANT IM NJX  INFLUENZA VACCINE, QUAD, PRESERVATIVE FREE, 0.5 ML

## 2023-09-20 NOTE — PROCEDURES
Procedure explained, informed consent obtained. Aseptic technique. Injection of extensor pollicis tendon sheath just proximal to radial styloid with 0.5 cc Kenalog 40 and 2 cc 1% lidocaine without epi. Tolerated well. Postprocedure instructions given. Call if any increasing pain, redness, fever, weakness.

## 2023-10-12 RX ORDER — ZOLPIDEM TARTRATE 12.5 MG/1
12.5 TABLET, FILM COATED, EXTENDED RELEASE ORAL NIGHTLY PRN
Qty: 30 TABLET | Refills: 3 | OUTPATIENT
Start: 2023-10-12

## 2023-11-27 NOTE — TELEPHONE ENCOUNTER
Please review; protocol failed. Requested Prescriptions   Pending Prescriptions Disp Refills    Zolpidem Tartrate ER 12.5 MG Oral Tab CR 30 tablet 3     Sig: Take 1 tablet (12.5 mg total) by mouth nightly as needed for Sleep.        There is no refill protocol information for this order        Recent Outpatient Visits              2 months ago [employer] Bradly MD    Office Visit    9 months ago BPH with obstruction/lower urinary tract symptoms    [facility], [address], Homero Chaney MD    Office Visit    12 months ago Routine physical examination    [address] [facility], [facility] Azalia Hess MD    Office Visit    1 year ago Shift work sleep disorder    [address], [facility], [address] [facility], MD    Telemedicine    2 years ago Cervicothoracic somatic dysfunction    [facility], [place] [address], [facility] [place], [place]    Office Visit

## 2023-11-28 RX ORDER — ZOLPIDEM TARTRATE 12.5 MG/1
12.5 TABLET, FILM COATED, EXTENDED RELEASE ORAL NIGHTLY PRN
Qty: 30 TABLET | Refills: 0 | Status: SHIPPED | OUTPATIENT
Start: 2023-11-28

## 2023-11-28 NOTE — TELEPHONE ENCOUNTER
Please review; protocol failed.    Requested Prescriptions   Pending Prescriptions Disp Refills    ZOLPIDEM TARTRATE ER 12.5 MG Oral Tab CR [Pharmacy Med Name: ZOLPIDEM ER 12.5MG TABLETS] 30 tablet 0     Sig: TAKE 1 TABLET(12.5 MG) BY MOUTH EVERY NIGHT AS NEEDED FOR SLEEP       There is no refill protocol information for this order        Recent Outpatient Visits              2 months ago [employer], right    [facility], Campbell Rogers MD    Office Visit    9 months ago BPH with obstruction/lower urinary tract symptoms    [facility], [address], Dann Funes MD    Office Visit    12 months ago Routine physical examination    [address] [facility], Rafa Saucedo MD    Office Visit    1 year ago Shift work sleep disorder    [address], [facility], [address] MD [place]    Telemedicine    2 years ago Cervicothoracic somatic dysfunction    [facility], [facility], [place], Phil Gutierrez, [place]    Office Visit

## 2023-12-01 RX ORDER — ZOLPIDEM TARTRATE 12.5 MG/1
12.5 TABLET, FILM COATED, EXTENDED RELEASE ORAL NIGHTLY PRN
Qty: 30 TABLET | Refills: 3 | Status: SHIPPED | OUTPATIENT
Start: 2023-12-01

## 2023-12-27 RX ORDER — ZOLPIDEM TARTRATE 12.5 MG/1
12.5 TABLET, FILM COATED, EXTENDED RELEASE ORAL NIGHTLY PRN
Qty: 30 TABLET | Refills: 0 | OUTPATIENT
Start: 2023-12-27

## 2023-12-27 NOTE — TELEPHONE ENCOUNTER
Pharmacy    [facility] , [place], [phone], [phone]        Disp Refills Start End    Zolpidem Tartrate ER 12.5 MG Oral Tab CR 30 tablet 3 12/1/2023 --    Sig - Route: Take 1 tablet (12.5 mg total) by mouth nightly as needed for Sleep. - Oral    Sent to pharmacy as: Zolpidem Tartrate ER 12.5 MG Oral Tablet Extended Release (AMBIEN CR)    Notes to Pharmacy: Shift work sleep disorder. Zolpidem 10 mg not adequately effective.     E-Prescribing Status: Receipt confirmed by pharmacy (12/1/2023 10:02 AM CST)

## 2024-04-20 DIAGNOSIS — Z72.820 POOR SLEEP: Primary | ICD-10-CM

## 2024-04-22 RX ORDER — ZOLPIDEM TARTRATE 12.5 MG/1
12.5 TABLET, FILM COATED, EXTENDED RELEASE ORAL NIGHTLY PRN
Qty: 30 TABLET | Refills: 0 | Status: SHIPPED | OUTPATIENT
Start: 2024-04-22

## 2024-04-22 RX ORDER — ZOLPIDEM TARTRATE 12.5 MG/1
12.5 TABLET, FILM COATED, EXTENDED RELEASE ORAL NIGHTLY PRN
Qty: 30 TABLET | Refills: 3 | OUTPATIENT
Start: 2024-04-22

## 2024-04-22 NOTE — TELEPHONE ENCOUNTER
Patient's wife calling. Patient had made refill request as of 4/19, see TE below this one.     Patient is completely out of medication at this time.     Can we expedite the refill?    [facility] in [place].    Please call patient's wife when refill has been sent so they can pick it up.

## 2024-04-22 NOTE — TELEPHONE ENCOUNTER
Routing to [employer] due to High Priority status and Dr. Jung is out of office.    Please review; protocol failed.    Recent fills: 03/30/24, 03/27/24, 02/28/24, and 01/31/24  Last Rx written: 12/01/2023  LOV: 09/20/2023    Requested Prescriptions   Pending Prescriptions Disp Refills    ZOLPIDEM TARTRATE ER 12.5 MG Oral Tab CR [Pharmacy Med Name: ZOLPIDEM ER 12.5MG TABLETS] 30 tablet 0     Sig: TAKE 1 TABLET(12.5 MG) BY MOUTH EVERY NIGHT AS NEEDED FOR SLEEP       Controlled Substance Medication Failed - 4/22/2024  8:29 AM        Failed - This medication is a controlled substance - forward to provider to refill             Future Appointments         Provider Department Appt Notes    In 2 months Danilo Jung DO [facility] Follow up and discuss sleep study          Recent Outpatient Visits              7 months ago De Quervain's tenosynovitis, right    [facility] Viji Huitron MD    Office Visit    1 year ago BPH with obstruction/lower urinary tract symptoms    [facility]    Office Visit    1 year ago Routine physical examination    [facility] Viji Huitron MD    Office Visit    1 year ago Shift work sleep disorder    [facility] Viji Huitron MD    Telemedicine    2 years ago Cervicothoracic somatic dysfunction    [facility] Danilo Jung DO    Office Visit

## 2024-05-22 DIAGNOSIS — Z72.820 POOR SLEEP: ICD-10-CM

## 2024-05-22 RX ORDER — ZOLPIDEM TARTRATE 12.5 MG/1
12.5 TABLET, FILM COATED, EXTENDED RELEASE ORAL NIGHTLY PRN
Qty: 30 TABLET | Refills: 3 | Status: SHIPPED | OUTPATIENT
Start: 2024-05-22

## 2024-05-22 RX ORDER — ZOLPIDEM TARTRATE 12.5 MG/1
12.5 TABLET, FILM COATED, EXTENDED RELEASE ORAL NIGHTLY PRN
Qty: 30 TABLET | Refills: 0 | OUTPATIENT
Start: 2024-05-22

## 2024-05-22 NOTE — TELEPHONE ENCOUNTER
Duplicate request, previously addressed.    Already sent to Dr. Jung and [facility]mate because patient is out of med, and Dr. Jung is out of office.  Patient recently saw Dr. Huitron.

## 2024-05-22 NOTE — TELEPHONE ENCOUNTER
Please review.  Protocol failed / Has no protocol.   *Routing to [employer] due to High Priority status and Dr. Jung is out of office, and most recent visits patient saw Dr. Huitron    Recent fills : 1/30 quantity 30, 2/28 quantity 30, 3/27 quantity 24, 4/22 quantity 30  patient is due.  Last prescription written: 4/22/2024  Last office visit: 9/30/2023    Future Appointments  Date Type Provider Dept   07/18/24 Appointment Danilo Jung DO [facility]       Requested Prescriptions   Pending Prescriptions Disp Refills    Zolpidem Tartrate ER 12.5 MG Oral Tab CR 30 tablet 0     Sig: Take 1 tablet (12.5 mg total) by mouth nightly as needed.       Controlled Substance Medication Failed - 5/22/2024  7:12 AM        Failed - This medication is a controlled substance - forward to provider to refill           Future Appointments         Provider Department Appt Notes    In 1 month Danilo Jung DO [facility] Follow up and discuss sleep study          Recent Outpatient Visits              8 months ago De Quervain's tenosynovitis, right    [facility] Viji Huitron MD    Office Visit    1 year ago BPH with obstruction/lower urinary tract symptoms    [facility]    Office Visit    1 year ago Routine physical examination    [facility] Viji Huitron MD    Office Visit    1 year ago Shift work sleep disorder    [facility] ElanaViji MD    Telemedicine    2 years ago Cervicothoracic somatic dysfunction    [facility] Danilo Jung DO    Office Visit

## 2024-06-28 ENCOUNTER — PATIENT MESSAGE (OUTPATIENT)
Dept: FAMILY MEDICINE CLINIC | Facility: CLINIC | Age: 54
End: 2024-06-28

## 2024-06-29 NOTE — TELEPHONE ENCOUNTER
Closed    Close reason: Other  Payer: [employer] Generic Payer  Note from payer: Sorry but [employer] cannot process this PA request electronically. Please refer to the original instructions from the PBM for information on how to process this prior authorization manually. - An obsolete NDC was found and replaced on the Prior Authorization to allow PA to flow electronically.

## 2024-06-29 NOTE — TELEPHONE ENCOUNTER
From: Mukesh Marrero Jr.  To: Danilo Jung  Sent: 6/28/2024 8:54 AM CDT  Subject: Prior authorization for zolpidem 12.5    [facility] will not refill my full prescription without the prior authorization for zolpidem 12.5.  They are located at [facility] in [place], [place]., thanks.

## 2024-07-01 NOTE — TELEPHONE ENCOUNTER
Dispensed Written Strength Quantity Refills Days Supply Provider Pharmacy   ZOLPIDEM ER 12.5MG TABLETS 06/20/2024 05/22/2024  15 each  15 Danilo Jung,  [facility] #...

## 2024-07-15 RX ORDER — FOLIC ACID 1 MG/1
TABLET ORAL
Qty: 3 CAPSULE | Refills: 0 | Status: SHIPPED | OUTPATIENT
Start: 2024-07-15

## 2024-07-15 NOTE — TELEPHONE ENCOUNTER
Please review. Protocol Failed; No Protocol    Requested Prescriptions   Pending Prescriptions Disp Refills    VITAMIN D3 1.25 MG (61703 UT) Oral Cap [Pharmacy Med Name: VITAMIN D3 50,000 IU (MARLEEN) CAP] 3 capsule 0     Sig: TAKE 1 CAPSULE BY MOUTH EVERY 30 DAYS       There is no refill protocol information for this order            Future Appointments         Provider Department Appt Notes    In 3 days Danilo Jung DO [facility] Follow up and discuss sleep study          Recent Outpatient Visits              9 months ago De Quervain's tenosynovitis, right    [facility] Viji Huitron MD    Office Visit    1 year ago BPH with obstruction/[facility] tract [facility]    Office Visit    1 year ago Routine physical examination    [facility] Viji Huitron MD    Office Visit    1 year ago Shift work sleep disorder    [facility]    Telemedicine    2 years ago Cervicothoracic somatic dysfunction    [facility] Danilo Jung DO    Office Visit

## 2024-07-22 DIAGNOSIS — Z72.820 POOR SLEEP: ICD-10-CM

## 2024-07-22 RX ORDER — ZOLPIDEM TARTRATE 12.5 MG/1
12.5 TABLET, FILM COATED, EXTENDED RELEASE ORAL NIGHTLY PRN
Qty: 30 TABLET | Refills: 3 | Status: CANCELLED | OUTPATIENT
Start: 2024-07-22

## 2024-07-23 ENCOUNTER — TELEPHONE (OUTPATIENT)
Dept: FAMILY MEDICINE CLINIC | Facility: CLINIC | Age: 54
End: 2024-07-23

## 2024-07-23 DIAGNOSIS — Z72.820 POOR SLEEP: ICD-10-CM

## 2024-07-23 NOTE — TELEPHONE ENCOUNTER
Wife calling , stated patient is out of sleeping med,requesting PA per pharmacy, spoke to pharmacist Maral, 15 min call-stated do not need PA, Insurance will only pay for 15 for 25 days, noticed pt use Good RX with the 30 qty , pt has refills will process today  Pt's wife advised/grateful   
30-Jul-2019 06:10

## 2024-07-25 RX ORDER — FOLIC ACID 1 MG/1
TABLET ORAL
Qty: 3 CAPSULE | Refills: 0 | OUTPATIENT
Start: 2024-07-25

## 2024-08-24 DIAGNOSIS — Z72.820 POOR SLEEP: ICD-10-CM

## 2024-08-25 DIAGNOSIS — Z72.820 POOR SLEEP: ICD-10-CM

## 2024-08-26 RX ORDER — ZOLPIDEM TARTRATE 12.5 MG/1
12.5 TABLET, FILM COATED, EXTENDED RELEASE ORAL NIGHTLY PRN
Qty: 30 TABLET | Refills: 0 | OUTPATIENT
Start: 2024-08-26

## 2024-08-26 NOTE — TELEPHONE ENCOUNTER
Please review. Protocol Failed; No Protocol      Recent fills: 7/12/2024 quantity 10, 7/24/2024 quantity 30, 8/20/2024 quantity 5  Last Rx written: 5/22/2024  Last office visit: 9/20/2023 with Dr. Elana Charles message sent to patient to schedule an office visit with Provider and/or  Routed to Patient  for assistance with appointment.           Requested Prescriptions   Pending Prescriptions Disp Refills    ZOLPIDEM TARTRATE ER 12.5 MG Oral Tab CR [Pharmacy Med Name: ZOLPIDEM ER 12.5MG TABLETS] 30 tablet 0     Sig: TAKE 1 TABLET(12.5 MG) BY MOUTH EVERY NIGHT AS NEEDED       Controlled Substance Medication Failed - 8/24/2024 11:59 PM        Failed - This medication is a controlled substance - forward to provider to refill                 Recent Outpatient Visits              11 months ago De Quervain's tenosynovitis, right    [facility] Viji Huitron MD    Office Visit    1 year ago [facility] obstruction[facility]    Office Visit    1 year ago Routine physical examination    [facility] Viji Huitron MD    Office Visit    1 year ago Shift work sleep disorder    [facility] Viji Huitron MD    Telemedicine    2 years ago Cervicothoracic somatic dysfunction    [facility] Danilo Jung DO    Office Visit

## 2024-08-27 RX ORDER — ZOLPIDEM TARTRATE 12.5 MG/1
12.5 TABLET, FILM COATED, EXTENDED RELEASE ORAL NIGHTLY PRN
Qty: 30 TABLET | Refills: 0 | Status: SHIPPED | OUTPATIENT
Start: 2024-08-27

## 2024-10-10 ENCOUNTER — TELEPHONE (OUTPATIENT)
Dept: FAMILY MEDICINE CLINIC | Facility: CLINIC | Age: 54
End: 2024-10-10

## 2024-10-10 DIAGNOSIS — Z72.820 POOR SLEEP: ICD-10-CM

## 2024-10-14 DIAGNOSIS — Z72.820 POOR SLEEP: ICD-10-CM

## 2024-10-14 RX ORDER — ZOLPIDEM TARTRATE 12.5 MG/1
12.5 TABLET, FILM COATED, EXTENDED RELEASE ORAL NIGHTLY PRN
Qty: 30 TABLET | Refills: 0 | Status: CANCELLED | OUTPATIENT
Start: 2024-10-14

## 2024-10-14 NOTE — TELEPHONE ENCOUNTER
Calling [facility] to verify what the exact rejections is for his Zolpidem ER 12.5 mg. Is it a plan limitation exceeded or is this medication just not covered by patient's insurance and cannot do a prior authorization?    After speaking with pharmacist, Florence she was unable to run the zolpidem through the insurance to see the exact rejection due to patient not having any refills.    I advised Florence that I would contact the patient to have him call his insurance and ask exactly what is need to get this medication paid for. This medication is likely not covered, due a plan limitation. Since the patient is only able to get #15 for 30 days instead of #30 for 30 days.

## 2024-10-14 NOTE — TELEPHONE ENCOUNTER
If patient responds back via MyChart or phone call, please explain to patient that his insurance does not need a prior authorization for this medication, as it is just not covered by his insurance. Patient will need to either get 15 every 30 days or he will need to use a GoodRx coupon for the full #30.    This information was relayed and explained to patient's wife 7/23/2024.    Please advise telephone encounter 7/23/2024 stating that the insurance will NOT cover 30 for 30 on patient's Zolpidem ER 12.5 mg prescription:  Wife calling , stated patient is out of sleeping med,requesting PA per pharmacy, spoke to pharmacist Maral, 15 min call-stated do not need PA, Insurance will only pay for 15 for 25 days, noticed pt use [facility] with the 30 qty , pt has refills will process today  Pt's wife advised/grateful     Requested Prescriptions     Pending Prescriptions Disp Refills    Zolpidem Tartrate ER 12.5 MG Oral Tab CR 30 tablet 0     Sig: Take 1 tablet (12.5 mg total) by mouth nightly as needed.

## 2024-10-14 NOTE — TELEPHONE ENCOUNTER
Please review; protocol failed    Future Appointments   Date Time Provider Department Center   12/20/2024  9:30 AM [facility] MD [facility]   1/14/2025  9:40 AM Danilo Jung DO [facility]     Last office visit: 9/20/2023    Last complete physical exam: 11/30/2022    Recent fill dates: 10/1/2024 (For qty of: #15 each for a 15 day supply), 9/18/2024 (For qty of: #15 each for a 15 day supply), and 8/25/24 (For qty of: #25 each for a 25 day supply)  Date of  last written prescription:      Date: 8/27/2024    Requested Prescriptions   Pending Prescriptions Disp Refills    Zolpidem Tartrate ER 12.5 MG Oral Tab CR 30 tablet 0     Sig: Take 1 tablet (12.5 mg total) by mouth nightly as needed.       Controlled Substance Medication Failed - 10/14/2024 10:17 AM        Failed - This medication is a controlled substance - forward to provider to refill             Future Appointments         Provider Department Appt Notes    In 2 months Viji Huitron MD [facility] Annual physical 11/30/22    In 3 months Danilo Jung DO [facility] px last px was 11/30/2022 on file          Recent Outpatient Visits              1 year ago De Quervain's tenosynovitis, right    [facility] Viji Huitron MD    Office Visit    1 year ago BPH with obstruction/lower urinary tract symptoms    [facility]    [facility] Visit    1 year ago Routine physical examination    [facility] Viji Huitron MD    Office Visit    1 year ago Shift work sleep disorder    [facility] Viji Huitron MD    Telemedicine    3 years ago Cervicothoracic somatic dysfunction    [facility] Danilo Jung DO    Office Visit

## 2024-10-14 NOTE — TELEPHONE ENCOUNTER
Can you please contact pharmacist to see if there is authorization process for this patient who has shift work sleep disorder, and has been successfully treated with this.

## 2024-10-15 NOTE — TELEPHONE ENCOUNTER
See message below, pharmacist has already been contacted. Patients plan limit is 15 tablets per 30 days, wife was notified to use GoodRx to obtain full 30 tablets.

## 2024-10-16 RX ORDER — ZOLPIDEM TARTRATE 12.5 MG/1
12.5 TABLET, FILM COATED, EXTENDED RELEASE ORAL NIGHTLY PRN
Qty: 30 TABLET | Refills: 2 | Status: SHIPPED | OUTPATIENT
Start: 2024-10-16

## 2024-10-16 NOTE — TELEPHONE ENCOUNTER
Please review; protocol failed    Patient is not due for a refill until 10/25/24, this request is 10 days early.    Future Appointments   Date Time Provider Department Center   12/20/2024  9:30 AM Viji Huitron MD [facility]   1/14/2025  9:40 AM Danilo Jung DO [facility]     Last office visit: 9/20/2023  Last complete physical exam: 11/30/2022    Recent dates: Early based on qty and day supply from 4/22/24 refill date.  10/1/24 - #15 x 15 days  9/18/24 - #15 x 15 days  8/25/24 - #25 x 25 days  Date of  last written prescription:      Date: 8/27/24       Requested Prescriptions   Pending Prescriptions Disp Refills    ZOLPIDEM TARTRATE ER 12.5 MG Oral Tab CR [Pharmacy Med Name: ZOLPIDEM ER 12.5MG TABLETS] 30 tablet 0     Sig: TAKE 1 TABLET(12.5 MG) BY MOUTH EVERY NIGHT AS NEEDED       Controlled Substance Medication Failed - 10/16/2024  2:39 PM        Failed - This medication is a controlled substance - forward to provider to refill             Future Appointments         Provider Department Appt Notes    In 2 months Viji Huitron MD [facility] Annual physical 11/30/22    In 3 months Danilo Jung DO [facility] px last px was 11/30/2022 on file          Recent Outpatient Visits              1 year ago De Quervain's tenosynovitis, right    [facility] Viji Huitron MD    Office Visit    1 year ago BPH with obstruction/lower urinary tract symptoms    [facility]    Office Visit    1 year ago Routine physical examination    [facility] Viji Huitron MD    Office Visit    1 year ago Shift work sleep disorder    [facility] Viji Huitron MD    Telemedicine    3 years ago Cervicothoracic somatic dysfunction     [facility] Danilo Jung, DO    Office Visit

## 2024-10-16 NOTE — TELEPHONE ENCOUNTER
Patient indicated that since insurance will only cover 15 pills per 30 days, he would rather use GoodRx.com coupons and just get the full quantity of 30. Patient has not slept in 48 hours and needs to get some sleep. Does shift work. Please advise.

## 2024-10-30 DIAGNOSIS — Z72.820 POOR SLEEP: ICD-10-CM

## 2024-11-04 RX ORDER — FOLIC ACID 1 MG/1
TABLET ORAL
Qty: 3 CAPSULE | Refills: 0 | Status: SHIPPED | OUTPATIENT
Start: 2024-11-04

## 2024-11-04 RX ORDER — ZOLPIDEM TARTRATE 12.5 MG/1
12.5 TABLET, FILM COATED, EXTENDED RELEASE ORAL NIGHTLY PRN
Qty: 30 TABLET | Refills: 2 | OUTPATIENT
Start: 2024-11-04

## 2024-11-04 NOTE — TELEPHONE ENCOUNTER
Please review. Protocol Failed; No Protocol    Requested Prescriptions   Pending Prescriptions Disp Refills    Cholecalciferol (VITAMIN D3) 1.25 MG (45610 UT) Oral Cap 3 capsule 0     Sig: TAKE 1 CAPSULE BY MOUTH EVERY 30 DAYS       There is no refill protocol information for this order      Refused Prescriptions Disp Refills    Zolpidem Tartrate ER 12.5 MG Oral Tab CR 30 tablet 2     Sig: Take 1 tablet (12.5 mg total) by mouth nightly as needed.       Controlled Substance Medication Failed - 11/4/2024 11:42 AM        Failed - This medication is a controlled substance - forward to provider to refill               Future Appointments         Provider Department Appt Notes    In 1 month Viji Huitron MD [facility] Annual physical 11/30/22    In 2 months Danilo Jung DO [facility] px last px was 11/30/2022 on file          Recent Outpatient Visits              1 year ago De Quervain's tenosynovitis, right    [facility] Viji Huitron MD    Office Visit    1 year ago BPH with obstruction/lower urinary tract symptoms    [facility]    Office Visit    1 year ago Routine physical examination    [facility] Viji Huitron MD    Office Visit    2 years ago Shift work sleep disorder    [facility] Viji Huitron MD    Telemedicine    3 years ago Cervicothoracic somatic dysfunction    [facility] Danilo Jung DO    Office Visit

## 2024-12-20 ENCOUNTER — LAB ENCOUNTER (OUTPATIENT)
Dept: LAB | Age: 54
End: 2024-12-20
Attending: FAMILY MEDICINE
Payer: COMMERCIAL

## 2024-12-20 ENCOUNTER — TELEPHONE (OUTPATIENT)
Dept: FAMILY MEDICINE CLINIC | Facility: CLINIC | Age: 54
End: 2024-12-20

## 2024-12-20 ENCOUNTER — OFFICE VISIT (OUTPATIENT)
Dept: FAMILY MEDICINE CLINIC | Facility: CLINIC | Age: 54
End: 2024-12-20
Payer: COMMERCIAL

## 2024-12-20 VITALS
OXYGEN SATURATION: 91 % | BODY MASS INDEX: 31.92 KG/M2 | HEIGHT: 70 IN | WEIGHT: 223 LBS | SYSTOLIC BLOOD PRESSURE: 120 MMHG | HEART RATE: 91 BPM | DIASTOLIC BLOOD PRESSURE: 76 MMHG

## 2024-12-20 DIAGNOSIS — E66.811 CLASS 1 OBESITY DUE TO EXCESS CALORIES WITH SERIOUS COMORBIDITY AND BODY MASS INDEX (BMI) OF 32.0 TO 32.9 IN ADULT: ICD-10-CM

## 2024-12-20 DIAGNOSIS — R94.4 DECREASED GFR: ICD-10-CM

## 2024-12-20 DIAGNOSIS — Z00.00 ROUTINE PHYSICAL EXAMINATION: ICD-10-CM

## 2024-12-20 DIAGNOSIS — E66.09 CLASS 1 OBESITY DUE TO EXCESS CALORIES WITH SERIOUS COMORBIDITY AND BODY MASS INDEX (BMI) OF 32.0 TO 32.9 IN ADULT: ICD-10-CM

## 2024-12-20 DIAGNOSIS — Z86.0101 HISTORY OF ADENOMATOUS POLYP OF COLON: ICD-10-CM

## 2024-12-20 DIAGNOSIS — R53.83 OTHER FATIGUE: ICD-10-CM

## 2024-12-20 DIAGNOSIS — N40.1 BENIGN PROSTATIC HYPERPLASIA WITH URINARY FREQUENCY: ICD-10-CM

## 2024-12-20 DIAGNOSIS — Z00.00 ROUTINE PHYSICAL EXAMINATION: Primary | ICD-10-CM

## 2024-12-20 DIAGNOSIS — R35.0 BENIGN PROSTATIC HYPERPLASIA WITH URINARY FREQUENCY: ICD-10-CM

## 2024-12-20 DIAGNOSIS — Z72.820 POOR SLEEP: ICD-10-CM

## 2024-12-20 LAB
ANION GAP SERPL CALC-SCNC: 3 MMOL/L (ref 0–18)
BUN BLD-MCNC: 8 MG/DL (ref 9–23)
BUN/CREAT SERPL: 4.5 (ref 10–20)
CALCIUM BLD-MCNC: 9.6 MG/DL (ref 8.7–10.4)
CHLORIDE SERPL-SCNC: 109 MMOL/L (ref 98–112)
CHOLEST SERPL-MCNC: 186 MG/DL (ref ?–200)
CO2 SERPL-SCNC: 29 MMOL/L (ref 21–32)
COMPLEXED PSA SERPL-MCNC: 0.4 NG/ML (ref ?–4)
CREAT BLD-MCNC: 1.78 MG/DL
DEPRECATED RDW RBC AUTO: 44.6 FL (ref 35.1–46.3)
EGFRCR SERPLBLD CKD-EPI 2021: 44 ML/MIN/1.73M2 (ref 60–?)
ERYTHROCYTE [DISTWIDTH] IN BLOOD BY AUTOMATED COUNT: 13 % (ref 11–15)
FASTING PATIENT LIPID ANSWER: NO
FASTING STATUS PATIENT QL REPORTED: NO
GLUCOSE BLD-MCNC: 88 MG/DL (ref 70–99)
HCT VFR BLD AUTO: 46.7 %
HDLC SERPL-MCNC: 40 MG/DL (ref 40–59)
HGB BLD-MCNC: 16.4 G/DL
IRON SATN MFR SERPL: 34 %
IRON SERPL-MCNC: 110 UG/DL
LDLC SERPL CALC-MCNC: 118 MG/DL (ref ?–100)
MCH RBC QN AUTO: 32.2 PG (ref 26–34)
MCHC RBC AUTO-ENTMCNC: 35.1 G/DL (ref 31–37)
MCV RBC AUTO: 91.6 FL
NONHDLC SERPL-MCNC: 146 MG/DL (ref ?–130)
OSMOLALITY SERPL CALC.SUM OF ELEC: 290 MOSM/KG (ref 275–295)
PLATELET # BLD AUTO: 226 10(3)UL (ref 150–450)
POTASSIUM SERPL-SCNC: 3.8 MMOL/L (ref 3.5–5.1)
RBC # BLD AUTO: 5.1 X10(6)UL
SODIUM SERPL-SCNC: 141 MMOL/L (ref 136–145)
TIBC SERPL-MCNC: 328 UG/DL (ref 250–425)
TRANSFERRIN SERPL-MCNC: 220 MG/DL (ref 215–365)
TRIGL SERPL-MCNC: 157 MG/DL (ref 30–149)
VLDLC SERPL CALC-MCNC: 28 MG/DL (ref 0–30)
WBC # BLD AUTO: 6.5 X10(3) UL (ref 4–11)

## 2024-12-20 PROCEDURE — 84466 ASSAY OF TRANSFERRIN: CPT

## 2024-12-20 PROCEDURE — 85027 COMPLETE CBC AUTOMATED: CPT

## 2024-12-20 PROCEDURE — 83540 ASSAY OF IRON: CPT

## 2024-12-20 PROCEDURE — 36415 COLL VENOUS BLD VENIPUNCTURE: CPT

## 2024-12-20 PROCEDURE — 80061 LIPID PANEL: CPT

## 2024-12-20 PROCEDURE — 80048 BASIC METABOLIC PNL TOTAL CA: CPT

## 2024-12-20 RX ORDER — ZOLPIDEM TARTRATE 12.5 MG/1
12.5 TABLET, FILM COATED, EXTENDED RELEASE ORAL NIGHTLY PRN
Qty: 30 TABLET | Refills: 2 | Status: SHIPPED | OUTPATIENT
Start: 2024-12-20

## 2024-12-20 NOTE — TELEPHONE ENCOUNTER
Per Rx, Notes to Pharmacy: Using [employer], not insurance.   See TE 10/10/2024 and 10/14/24. Patients insurance plan only covers 15 tablets per 30 days.

## 2024-12-20 NOTE — PROGRESS NOTES
Subjective:   Patient ID: Mukesh Marrero Jr. is a 54 year old male.    Patient presents for routine physical.        History/Other:   Review of Systems   Constitutional: Negative.    Respiratory: Negative.     Cardiovascular: Negative.    Gastrointestinal: Negative.    Skin: Negative.    Neurological: Negative.    Psychiatric/Behavioral:  Positive for sleep disturbance.      Current Outpatient Medications   Medication Sig Dispense Refill    Zolpidem Tartrate ER 12.5 MG Oral Tab CR Take 1 tablet (12.5 mg total) by mouth nightly as needed. 30 tablet 2    tamsulosin 0.4 MG Oral Cap Take 1 capsule (0.4 mg total) by mouth daily.  TAKE 1/2 HOUR FOLLOWING THE SAME MEAL 90 capsule 3     Allergies:Allergies[1]    Objective:   Physical Exam  Constitutional:       Appearance: Normal appearance.   Cardiovascular:      Rate and Rhythm: Normal rate and regular rhythm.      Heart sounds: Normal heart sounds.   Pulmonary:      Effort: Pulmonary effort is normal.      Breath sounds: Normal breath sounds.   Abdominal:      Palpations: Abdomen is soft. There is no mass.      Tenderness: There is no abdominal tenderness.   Lymphadenopathy:      Cervical: No cervical adenopathy.   Skin:     General: Skin is warm and dry.   Neurological:      Mental Status: He is alert and oriented to person, place, and time.         Assessment & Plan:   1. 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  Currently working remote, will be returning to in office.  He works 6 PM to 6 AM 4 days on, 4 days off.  Encourage regular exercise  Nonfasting labs done today  Flu vaccine given     2. Poor sleep-with shift sleep disorder.  Uses zolpidem daily, when on work schedule sleeps from about 11 AM to 5:30 PM.  Uses 1 extra tablet each week when he switches back to regular schedule.  Reviewed instructions and side effects of medication.   3. History of adenomatous polyp of colon-referred to  gastroenterology to schedule colonoscopy   4. Benign prostatic hyperplasia with urinary frequency-tamsulosin was not effective, recommend follow-up with urology.   5. Class 1 obesity due to excess calories with serious comorbidity and body mass index (BMI) of 32.0 to 32.9 in adult-diet and exercise discussed   6. Other fatigue-likely due to shift disorder but recommend checking iron.  Consider sleep study.       Orders Placed This Encounter   Procedures    Lipid Panel [E]    Basic Metabolic Panel (8) [E]    CBC, Platelet, No Differential [E]    PSA (Screening) [E]    Iron And Tibc    INFLUENZA VACCINE, TRI, PRESERV FREE, 0.5 ML       Meds This Visit:  Requested Prescriptions     Signed Prescriptions Disp Refills    Zolpidem Tartrate ER 12.5 MG Oral Tab CR 30 tablet 2     Sig: Take 1 tablet (12.5 mg total) by mouth nightly as needed.       Imaging & Referrals:  UROLOGY - INTERNAL  INFLUENZA VACCINE, TRI, PRESERV FREE, 0.5 ML  OP REFERRAL TO [facility] GI TELEPHONE COLON SCREEN         [1] No Known Allergies

## 2024-12-20 NOTE — TELEPHONE ENCOUNTER
Current Outpatient Medications:     Zolpidem Tartrate ER 12.5 MG Oral Tab CR, Take 1 tablet (12.5 mg total) by mouth nightly as needed., Disp: 30 tablet, Rfl: 2      KEY: [id number]  PATIENT LAST NAME: NASRIN  : 1970

## 2024-12-26 ENCOUNTER — NURSE ONLY (OUTPATIENT)
Facility: CLINIC | Age: 54
End: 2024-12-26

## 2024-12-26 DIAGNOSIS — Z12.11 SCREEN FOR COLON CANCER: Primary | ICD-10-CM

## 2024-12-26 NOTE — PROGRESS NOTES
TCS. Colon recall.  Please advise on colonoscopy and bowel prep orders.   Medications, pharmacy, and allergies reviewed.     › H/W/BMI: 5'10\"/223lbs/32    Special comments/notes:  Recall    Last Procedure, Date, MD:   Colonoscopy, 8/9/17, [facility]   Last diagnosis: Tubular adenoma    Recalled for (mth/yrs): 5 years   Sedation used previously: IV   Last Prep Used: Trilyte    Quality of Prep: good     Telephone Colon Screening Questionnaire Yes No   Are you currently experiencing any GI symptoms [] [x]   If yes, explain:     Rectal bleeding [] [x]   Black stool [] [x]   Dysphagia or food \"feeling stuck\" when eating [] [x]   Intractable vomiting [] [x]   Unexplained weight loss [] [x]   First colonoscopy [] [x]   Family history of colon cancer [] [x]   Any issues with anesthesia [] [x]   If yes, explain:      Any recent complaints related to chest pain &/or shortness of breath [] [x]   Referred to a cardiologist?  [] [x]   If yes, explain:      History of  respiratory issues/oxygen/OBINNA/COPD [] [x]   CPAP/BiPAP:     History of devices (pacemaker/defibrillator) [] [x]   History of heart attack &/or stroke [] [x]   If yes, in the last 12 months? Stent placement?  [] [x]     Medication Reconciliation  Yes  No   Anticoagulants (except Aspirin) [] [x]   Diabetic Medication [] [x]   Weight loss medication (phentermine/vyvanse/saxsenda/etc) [] [x]   Iron/herbal/multivitamin supplement(s) [] [x]   Usage of marijuana, CBD &/or vape product(s): marijuana occasional [x] []

## 2024-12-26 NOTE — PROGRESS NOTES
COLONOSCOPY REPORT           Mukesh Marrero      1970 Age 47 year old   PCP Danilo Jung DO Endoscopist Tae Allen MD      Date of procedure: 17     Procedure: Colonoscopy w/cold snare polypectomy     Pre-operative diagnosis: Screening, rectal bleeding     Post-operative diagnosis: colon polyps, internal hemorrhoids     Medications given: Versed 5 mg IV push.  Fentanyl 100 mcg IV push [CONSCIOUS SEDATION provided by Tae Allen MD for 32 minutes. Pre/post-sedation assessment was performed by myself and the RN]     Withdrawal time: 18 minutes     Procedure:  Informed consent was obtained from the patient after the risks of the procedure were discussed, including but not limited to bleeding, perforation, aspiration, infection, or possibility of a missed lesion. After discussions of the risks/benefits and alternatives to this procedure, as well as the planned sedation, the patient was placed in the left lateral decubitus position and begun on continuous blood pressure pulse oximetry and EKG monitoring and this was maintained throughout the procedure. Once an adequate level of sedation was obtained a digital rectal exam was completed. Then the lubricated tip of the [id number] diagnostic video colonoscope was inserted and advanced without difficulty to the cecum using the air insufflation technique. The cecum was identified by localizing the trifold, the appendix and the ileocecal valve. Withdrawal was begun with thorough washing and careful examination of the colonic walls and folds. Photodocumentation was obtained. The bowel prep was good. Views of the colon were good with washing. I then carefully withdrew the instrument from the patient who tolerated the procedure well.      Complications: none.     Findings:   1. Four polyp(s) noted as follows:      A. 4 mm polyp in the cecum colon; flat morphology; cold snare polypectomy and retrieved.      B. 4 mm polyp in the  ascending colon; flat morphology; cold snare polypectomy and retrieved.      C. 7 mm polyp in the descending colon; flat morphology; cold snare polypectomy and retrieved.      D. 6 mm polyp in the descending colon; flat morphology; cold snare polypectomy and retrieved.     2. Diverticulosis: none.     3. Terminal ileum: the visualized mucosa appeared normal.     4. A retroflexed view of the rectum revealed small internal hemorrhoids.     5. The colonic mucosa throughout the colon showed normal vascular pattern, without evidence of angioectasias or inflammation.      6. GRADY: normal rectal tone, no masses palpated.      Impression:   The etiology of rectal bleeding is from small internal hemorrhoids.   Four small colon polyps removed.      Recommend:  Await pathology. Repeat colonoscopy in 10 years if ALL polyps are hyperplastic, however if any SINGLE polyp (<10mm) is a tubular adenoma then repeat in 5 years. If you have 3-10 tubular adenomas OR a single adenoma >10mm, repeat in 3 years. If new signs or symptoms develop, colonoscopy may need to be repeated sooner.   High fiber diet.  Monitor for blood in the stool. If having more than just tinge of blood, call office or go to the ER.     >>>If biopsies were performed and you have not received your pathology results either by phone or letter within 2 weeks, please call our office at [phone].     Specimens: colon                Electronically signed by MAXIMUS Allen MD at 8/9/2017  3:51 PM  Final Diagnosis:      A. Cecum polyp; biopsy:  Tubular adenoma.     B. Ascending colon polyp; biopsy:  Minute fragment of colonic tissue with changes consistent with hyperplastic polyp.     C. Descending colon polyp; biopsy:  Tubular adenoma.  Separate fragments of hyperplastic polyp.

## 2025-01-06 ENCOUNTER — TELEPHONE (OUTPATIENT)
Dept: FAMILY MEDICINE CLINIC | Facility: CLINIC | Age: 55
End: 2025-01-06

## 2025-01-06 NOTE — TELEPHONE ENCOUNTER
Patient called regarding the following medication:      Zolpidem Tartrate ER 12.5 MG Oral Tab CR       Per patient his pharmacy and insurance company keeps changing his prescription themselves without communicating with the Drs office. He stated his pharmacy is now only providing him with 15 pills and he is completely out of medication and he has been having issues with getting the medication refilled with the pharmacy due to these changes they are making. He stated that he has to work tonight and he needs the medication refilled ASAP as he needs to sleep to work tonight. Per patient he has been paying out of pocket to avoid issues with his insurance company to be able to get his medication refilled. Patient stated this has been an on-going issue and he needs assistance with getting the issues resolved.

## 2025-01-06 NOTE — TELEPHONE ENCOUNTER
To refer to telephone encounter 10/14/24:  Calling [facility] to verify what the exact rejections is for his Zolpidem ER 12.5 mg. Is it a plan limitation exceeded or is this medication just not covered by patient's insurance and cannot do a prior authorization?     After speaking with pharmacist, Florence she was unable to run the zolpidem through the insurance to see the exact rejection due to patient not having any refills.     I advised Florence that I would contact the patient to have him call his insurance and ask exactly what is need to get this medication paid for. This medication is likely not covered, due a plan limitation. Since the patient is only able to get #15 for 30 days instead of #30 for 30 days.    This is a plan limitation, unable to do a prior authorization, as patient's insurance just does not cover more than 15 tablets/month.    Unless patient's plan or insurance changes, patient will have to pay out of pocket for Zolpidem prescription.

## 2025-01-20 NOTE — PROGRESS NOTES
Janneth Kelley[facility]        Please send prep       [id number] - [place], [place] - [address] AT [facility] OF [address], [phone], [phone] [address] [place] [address]   Phone: [phone] Fax: [phone]   Hours: Open 24 hours

## 2025-01-20 NOTE — PROGRESS NOTES
Scheduled for:  Colonoscopy 33095  Provider Name:     Date:  Monday, 05/19/2025   Location:  [facility]   Sedation:  Mac  Time:  8am (pt is aware Cf will call with arrival time     Prep:  Golytely   Meds/Allergies Reconciled?:  Yes    Diagnosis with codes:  Screening Z12.11  Was patient informed to call insurance with codes (Y/N):  Yes     Referral sent?:  Referral was sent at the time of electronic surgical scheduling.    [facility] or [facility] notified?:  I sent an electronic request to Endo Scheduling and received a confirmation today.       Medication Orders:  None  Misc Orders:  None     Further instructions given by staff:  I discussed the prep instructions with the patient which he verbally understood and is aware that I will send the instructions today.via [employer]

## 2025-01-21 ENCOUNTER — LAB ENCOUNTER (OUTPATIENT)
Dept: LAB | Age: 55
End: 2025-01-21
Attending: FAMILY MEDICINE
Payer: COMMERCIAL

## 2025-01-21 ENCOUNTER — OFFICE VISIT (OUTPATIENT)
Dept: FAMILY MEDICINE CLINIC | Facility: CLINIC | Age: 55
End: 2025-01-21
Payer: COMMERCIAL

## 2025-01-21 VITALS
OXYGEN SATURATION: 95 % | SYSTOLIC BLOOD PRESSURE: 118 MMHG | WEIGHT: 225 LBS | BODY MASS INDEX: 32 KG/M2 | DIASTOLIC BLOOD PRESSURE: 80 MMHG | HEART RATE: 72 BPM

## 2025-01-21 DIAGNOSIS — R94.4 DECREASED GFR: ICD-10-CM

## 2025-01-21 DIAGNOSIS — R94.4 DECREASED GFR: Primary | ICD-10-CM

## 2025-01-21 DIAGNOSIS — S02.31XD CLOSED FRACTURE OF RIGHT ORBITAL FLOOR WITH ROUTINE HEALING, SUBSEQUENT ENCOUNTER: ICD-10-CM

## 2025-01-21 DIAGNOSIS — Z48.02 VISIT FOR SUTURE REMOVAL: ICD-10-CM

## 2025-01-21 LAB
ALBUMIN SERPL-MCNC: 4.2 G/DL (ref 3.2–4.8)
ALBUMIN/GLOB SERPL: 1.6 {RATIO} (ref 1–2)
ALP LIVER SERPL-CCNC: 65 U/L
ALT SERPL-CCNC: 16 U/L
ANION GAP SERPL CALC-SCNC: 5 MMOL/L (ref 0–18)
AST SERPL-CCNC: 15 U/L (ref ?–34)
BILIRUB SERPL-MCNC: 1 MG/DL (ref 0.3–1.2)
BILIRUB UR QL: NEGATIVE
BUN BLD-MCNC: 8 MG/DL (ref 9–23)
BUN/CREAT SERPL: 6.5 (ref 10–20)
CALCIUM BLD-MCNC: 9.5 MG/DL (ref 8.7–10.4)
CHLORIDE SERPL-SCNC: 109 MMOL/L (ref 98–112)
CLARITY UR: CLEAR
CO2 SERPL-SCNC: 28 MMOL/L (ref 21–32)
CREAT BLD-MCNC: 1.23 MG/DL
EGFRCR SERPLBLD CKD-EPI 2021: 69 ML/MIN/1.73M2 (ref 60–?)
FASTING STATUS PATIENT QL REPORTED: YES
GLOBULIN PLAS-MCNC: 2.7 G/DL (ref 2–3.5)
GLUCOSE BLD-MCNC: 100 MG/DL (ref 70–99)
GLUCOSE UR-MCNC: NORMAL MG/DL
HGB UR QL STRIP.AUTO: NEGATIVE
KETONES UR-MCNC: NEGATIVE MG/DL
LEUKOCYTE ESTERASE UR QL STRIP.AUTO: NEGATIVE
NITRITE UR QL STRIP.AUTO: NEGATIVE
OSMOLALITY SERPL CALC.SUM OF ELEC: 292 MOSM/KG (ref 275–295)
PH UR: 6 [PH] (ref 5–8)
POTASSIUM SERPL-SCNC: 4 MMOL/L (ref 3.5–5.1)
PROT SERPL-MCNC: 6.9 G/DL (ref 5.7–8.2)
PROT UR-MCNC: NEGATIVE MG/DL
SODIUM SERPL-SCNC: 142 MMOL/L (ref 136–145)
SP GR UR STRIP: 1.02 (ref 1–1.03)
UROBILINOGEN UR STRIP-ACNC: NORMAL

## 2025-01-21 PROCEDURE — 36415 COLL VENOUS BLD VENIPUNCTURE: CPT

## 2025-01-21 PROCEDURE — 81003 URINALYSIS AUTO W/O SCOPE: CPT

## 2025-01-21 PROCEDURE — 90677 PCV20 VACCINE IM: CPT | Performed by: FAMILY MEDICINE

## 2025-01-21 PROCEDURE — G2211 COMPLEX E/M VISIT ADD ON: HCPCS | Performed by: FAMILY MEDICINE

## 2025-01-21 PROCEDURE — 99214 OFFICE O/P EST MOD 30 MIN: CPT | Performed by: FAMILY MEDICINE

## 2025-01-21 PROCEDURE — 3079F DIAST BP 80-89 MM HG: CPT | Performed by: FAMILY MEDICINE

## 2025-01-21 PROCEDURE — 90471 IMMUNIZATION ADMIN: CPT | Performed by: FAMILY MEDICINE

## 2025-01-21 PROCEDURE — 3074F SYST BP LT 130 MM HG: CPT | Performed by: FAMILY MEDICINE

## 2025-01-21 PROCEDURE — 80053 COMPREHEN METABOLIC PANEL: CPT

## 2025-01-21 RX ORDER — FOLIC ACID 1 MG/1
1 TABLET ORAL
COMMUNITY
Start: 2025-01-06
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
## 2025-03-30 DIAGNOSIS — Z72.820 POOR SLEEP: ICD-10-CM

## 2025-03-31 DIAGNOSIS — Z72.820 POOR SLEEP: ICD-10-CM

## 2025-03-31 RX ORDER — ZOLPIDEM TARTRATE 12.5 MG/1
12.5 TABLET, FILM COATED, EXTENDED RELEASE ORAL NIGHTLY PRN
Qty: 30 TABLET | Refills: 0 | OUTPATIENT
Start: 2025-03-31

## 2025-03-31 RX ORDER — ZOLPIDEM TARTRATE 12.5 MG/1
12.5 TABLET, FILM COATED, EXTENDED RELEASE ORAL NIGHTLY PRN
Qty: 30 TABLET | Refills: 0 | Status: SHIPPED | OUTPATIENT
Start: 2025-03-31

## 2025-03-31 RX ORDER — FOLIC ACID 1 MG/1
1 TABLET ORAL
Qty: 1 CAPSULE | Refills: 0 | Status: SHIPPED | OUTPATIENT
Start: 2025-03-31

## 2025-03-31 RX ORDER — ZOLPIDEM TARTRATE 12.5 MG/1
12.5 TABLET, FILM COATED, EXTENDED RELEASE ORAL NIGHTLY PRN
Qty: 30 TABLET | Refills: 2 | OUTPATIENT
Start: 2025-03-31

## 2025-03-31 NOTE — TELEPHONE ENCOUNTER
Please review. Protocol Failed; No Protocol    Routing to podmates due to High Priority status and Dr. Huitron is out of office.        Recent fills: 2/16/2025, 3/2/2025, 3/15/2025  Last Rx written: 12/20/2024  Last office visit: 1/21/2025

## 2025-03-31 NOTE — TELEPHONE ENCOUNTER
The patient calling to stated he has been out of 2 days and cannot sleep.  The patient stated his insurance will only pay for 15 pills a day.    She has been paying for the 15 that the insurance will not pay for.    He stated he needs the medication today.    Dr. Huitron is out of the office.  The patient asked the medication be sent to Dr. Jung.

## 2025-03-31 NOTE — TELEPHONE ENCOUNTER
Please review. Protocol Failed; No Protocol    Medication(s) to Refill:   Requested Prescriptions     Pending Prescriptions Disp Refills    Cholecalciferol (VITAMIN D3) 1.25 MG (50956 UT) Oral Cap 1 capsule 0     Sig: Take 1 capsule by mouth every 30 (thirty) days.         Reason for Medication Refill being sent to Provider / Reason Protocol Failed:  [x] Non-Protocol Medication        Recent Labs:  Lab Results   Component Value Date    VITD 11 (L) 04/27/2017

## 2025-03-31 NOTE — TELEPHONE ENCOUNTER
Patient is calling for status of his medication refill request. Per the patient he is out of medication. Patient can be reached at [phone].

## 2025-03-31 NOTE — TELEPHONE ENCOUNTER
Patient is requesting a refill on his vitamin D medication. Per the patient he is out of medication. Pharmacy: [facility]/[place], [place] (Listed)     Current Outpatient Medications   Medication Sig Dispense Refill    Cholecalciferol (VITAMIN D 3) 1.25 MG (31821 UT) Oral Cap Take 1 capsule by mouth every 30 (thirty) days.

## 2025-04-28 DIAGNOSIS — Z72.820 POOR SLEEP: ICD-10-CM

## 2025-04-28 NOTE — TELEPHONE ENCOUNTER
Patient called to check the status on this refill request. Per patient his insurance company is only allowing him 15 pill for 15 days.

## 2025-04-29 RX ORDER — ZOLPIDEM TARTRATE 12.5 MG/1
12.5 TABLET, FILM COATED, EXTENDED RELEASE ORAL NIGHTLY PRN
Qty: 15 TABLET | Refills: 3 | Status: SHIPPED | OUTPATIENT
Start: 2025-04-29

## 2025-04-29 NOTE — TELEPHONE ENCOUNTER
Patient called checking status of refill   Patient completely out     [id number] - [place], [place] - [address] AT [facility] OF [facility] [address],

## 2025-04-29 NOTE — TELEPHONE ENCOUNTER
Please review; protocol failed/No Protocol      Recent Fills: 03/02/2025 #15, 03/15/2025 #15, 03/31/2025 #15, 04/15/2025 #15    Last Rx Written: 03/31/2025    Last Office Visit: 01/21/2025

## 2025-05-19 PROBLEM — K63.5 POLYP OF COLON: Status: ACTIVE | Noted: 2025-05-19

## 2025-05-19 PROCEDURE — 88305 TISSUE EXAM BY PATHOLOGIST: CPT | Performed by: INTERNAL MEDICINE

## 2025-06-12 NOTE — PROGRESS NOTES
I mailed out letter to patient   Updated the health maintenance and patient outreach  Entered 5 Yr Colon Recall     Last CLN done: 05/19/2025  Next CLN due: 05/19/2030    MAXIMUS Allen MD  [facility] Clinical Staff  GI staff: please place recall for colonoscopy in 5 years

## 2025-06-23 ENCOUNTER — TELEPHONE (OUTPATIENT)
Dept: FAMILY MEDICINE CLINIC | Facility: CLINIC | Age: 55
End: 2025-06-23

## 2025-06-23 DIAGNOSIS — Z72.820 POOR SLEEP: ICD-10-CM

## 2025-06-25 DIAGNOSIS — Z72.820 POOR SLEEP: ICD-10-CM

## 2025-06-25 RX ORDER — ZOLPIDEM TARTRATE 12.5 MG/1
12.5 TABLET, FILM COATED, EXTENDED RELEASE ORAL NIGHTLY PRN
Qty: 30 TABLET | Refills: 0 | OUTPATIENT
Start: 2025-06-25

## 2025-06-25 RX ORDER — ZOLPIDEM TARTRATE 12.5 MG/1
12.5 TABLET, FILM COATED, EXTENDED RELEASE ORAL NIGHTLY PRN
Qty: 15 TABLET | Refills: 3 | Status: SHIPPED | OUTPATIENT
Start: 2025-06-25

## 2025-06-25 NOTE — TELEPHONE ENCOUNTER
For replies, please route to pool: [facility] CENTRAL REFILLS    Please review: medication fails/has no protocol attached.  Medication request is marked high priority: per patient - out of medication    Patient comment: Insurance only allows 15 pills for 30 days. I pay the rest out of pocket. Please refill 15 pills, thanks.     No future appointments with primary care medicine   Last office visit: 1/21/25    Recent fill dates: 6/10/25, 5/27/25, 5/14/25 and 4/30/25  Date of  last written prescription: 4/29/25   Last dispensed quantity: #15 each and processed as a 15 day supply

## 2025-08-19 DIAGNOSIS — Z72.820 POOR SLEEP: ICD-10-CM

## 2025-08-22 RX ORDER — ZOLPIDEM TARTRATE 12.5 MG/1
12.5 TABLET, FILM COATED, EXTENDED RELEASE ORAL NIGHTLY PRN
Qty: 15 TABLET | Refills: 3 | Status: SHIPPED | OUTPATIENT
Start: 2025-08-22

## (undated) DIAGNOSIS — G47.00 INSOMNIA, UNSPECIFIED TYPE: ICD-10-CM

## (undated) DEVICE — TRAP 4 CPTR CHMBR N EZ INLN

## (undated) DEVICE — ENDOSCOPY PACK - LOWER: Brand: MEDLINE INDUSTRIES, INC.

## (undated) DEVICE — SNARE 9MM 230CM 2.4MM EXACTO

## (undated) DEVICE — Device: Brand: DEFENDO AIR/WATER/SUCTION AND BIOPSY VALVE

## (undated) DEVICE — FORCEP RADIAL JAW 4

## (undated) NOTE — LETTER
[facility], [facility], [address]  [phone]                08/11/17      [address] [address]        Dear Rodney García,    I reviewed the pathology report from

## (undated) NOTE — LETTER
7/11/2022    [address] [facility]            Dear Collin Perera.,      Our records indicate that you are due for an appointment for a Colonoscopy in August 2022, or sometime there after, with Grace Srinivasan MD. Our doctors are booking out about 3-5 months for procedures. Please call our office to schedule this appointment. Your medical well-being is important to us. If your insurance requires a referral, please call your primary care office to request one.       Thank you,      The Physicians and Staff at [facility]

## (undated) NOTE — LETTER
10/5/2019      [address] [facility] [address]      RE: Appointment Needed for Continuous Medication Refill     Dear Bo Pham: Your health care is very important to us.    Part of this process is monitoring your medi

## (undated) NOTE — LETTER
AUTHORIZATION FOR SURGICAL OPERATION OR OTHER PROCEDURE    1. I hereby authorize Dr. Pedrito Walker , and [address] [place] [address] staff assigned to my case to perform the following operation and/or procedure at the [address] [place] [address]:    Right wrist injection ______________________________________________________________________________      _______________________________________________________________________________________________    2. My physician has explained the nature and purpose of the operation or other procedure, possible alternative methods of treatment, the risks involved, and the possibility of complication to me. I acknowledge that no guarantee has been made as to the result that may be obtained. 3.  I recognize that, during the course of this operation, or other procedure, unforseen conditions may necessitate additional or different procedure than those listed above. I, therefore, further authorize and request that the above named physician, his/her physician assistants or designees perform such procedures as are, in his/her professional opinion, necessary and desirable. 4.  Any tissue or organs removed in the operation or other procedure may be disposed of by and at the discretion of the [address] [place] [address] and [facility]. 5.  I understand that in the event of a medical emergency, I will be transported by local paramedics to [facility] or other [facility] emergency department. 6.  I certify that I have read and fully understand the above consent to operation and/or other procedure. 7.  I acknowledge that my physician has explained sedation/analgesia administration to me including the risks and benefits. I consent to the administration of sedation/analgesia as may be necessary or desirable in the judgement of my physician. Witness signature: ___________________________________________________ Date:  ______/______/_____                    Time:  ________ A. M.  P.M. Patient Name:  ______________________________________________________  (please print)      Patient signature:  ___________________________________________________             Relationship to Patient:           []  Parent    Responsible person                          []  Spouse  In case of minor or                    [] Other  _____________   Incompetent name:  __________________________________________________                               (please print)      _____________      Responsible person  In case of minor or  Incompetent signature:  _______________________________________________    Statement of Physician  My signature below affirms that prior to the time of the procedure, I have explained to the patient and/or his/her guardian, the risks and benefits involved in the proposed treatment and any reasonable alternative to the proposed treatment. I have also explained the risks and benefits involved in the refusal of the proposed treatment and have answered the patient's questions.                         Date:  ______/______/_______  Provider                      Signature:  __________________________________________________________       Time:  ___________ A.M    P.M.

## (undated) NOTE — LETTER
06/11/25      Mukesh Marrero Jr.  [address] [facility] [address]        Dear Mukesh,    I reviewed the pathology report from the polyp(s) completely removed during your recent colonoscopy. The polyp(s) removed was/were an adenoma, which is a benign growth.   However, these are the types of polyps that if not removed could become a colon cancer. All of your polyps were completely removed.    The current health care guidelines recommend that patients with the size and number of your adenoma type polyp(s) should repeat their colonoscopy in 5 years, for you that is in 2030, to look for any new polyps that might have grown.     If you have further questions please call me at [phone] or message me through [employer].    Sincerely,  MAXIMUS Allen MD

## (undated) NOTE — MR AVS SNAPSHOT
[facility]  [address], [address]  [phone]               Thank you for choosing us for your health care visit with Sheldon Flores DO.   We are glad to serve you and happy to provide you with this sum and done. Testosterone level drawn as well. Increase water intake and consider green smoothie daily.         Allergies as of Apr 27, 2017     No Known Allergies                Today's Vital Signs     BP Pulse Temp Height Weight BMI    112/76 mmHg 60 98.3 °F Don’t eat while when you’re bored.      EAT THESE FOODS MORE OFTEN: EAT THESE FOODS LESS OFTEN:   Make half your plate fruits and vegetables Highly refined, white starches including white bread, rice and pasta   Eat plenty of protein, keep the fat content l